# Patient Record
Sex: MALE | Race: ASIAN | Employment: FULL TIME | URBAN - METROPOLITAN AREA
[De-identification: names, ages, dates, MRNs, and addresses within clinical notes are randomized per-mention and may not be internally consistent; named-entity substitution may affect disease eponyms.]

---

## 2017-06-23 ENCOUNTER — OFFICE VISIT (OUTPATIENT)
Dept: INTERNAL MEDICINE CLINIC | Age: 37
End: 2017-06-23

## 2017-06-23 VITALS
DIASTOLIC BLOOD PRESSURE: 64 MMHG | BODY MASS INDEX: 21.42 KG/M2 | WEIGHT: 153 LBS | SYSTOLIC BLOOD PRESSURE: 120 MMHG | HEART RATE: 76 BPM | HEIGHT: 71 IN

## 2017-06-23 DIAGNOSIS — R71.8 RBC MICROCYTOSIS: ICD-10-CM

## 2017-06-23 DIAGNOSIS — H66.011 ACUTE SUPPURATIVE OTITIS MEDIA OF RIGHT EAR WITH SPONTANEOUS RUPTURE OF TYMPANIC MEMBRANE, RECURRENCE NOT SPECIFIED: ICD-10-CM

## 2017-06-23 DIAGNOSIS — E55.9 VITAMIN D DEFICIENCY: ICD-10-CM

## 2017-06-23 DIAGNOSIS — G44.89 OTHER HEADACHE SYNDROME: ICD-10-CM

## 2017-06-23 DIAGNOSIS — G61.0 GUILLAIN-BARRE SYNDROME (HCC): Primary | ICD-10-CM

## 2017-06-23 PROCEDURE — 99213 OFFICE O/P EST LOW 20 MIN: CPT | Performed by: INTERNAL MEDICINE

## 2017-06-26 ENCOUNTER — HOSPITAL ENCOUNTER (OUTPATIENT)
Dept: OTHER | Age: 37
Discharge: HOME | End: 2017-06-26
Attending: INTERNAL MEDICINE | Admitting: INTERNAL MEDICINE

## 2017-06-26 DIAGNOSIS — H66.011 ACUTE SUPPURATIVE OTITIS MEDIA OF RIGHT EAR WITH SPONTANEOUS RUPTURE OF TYMPANIC MEMBRANE, RECURRENCE NOT SPECIFIED: ICD-10-CM

## 2017-06-26 DIAGNOSIS — R71.8 RBC MICROCYTOSIS: ICD-10-CM

## 2017-06-26 DIAGNOSIS — G61.0 GUILLAIN-BARRE SYNDROME (HCC): ICD-10-CM

## 2017-06-26 DIAGNOSIS — G44.89 OTHER HEADACHE SYNDROME: ICD-10-CM

## 2017-06-26 DIAGNOSIS — E55.9 VITAMIN D DEFICIENCY: ICD-10-CM

## 2017-06-26 LAB
A/G RATIO: 1.5 (ref 1.1–2.2)
ALBUMIN SERPL-MCNC: 4.8 G/DL (ref 3.4–5)
ALP BLD-CCNC: 60 U/L (ref 40–129)
ALT SERPL-CCNC: 26 U/L (ref 10–40)
ANION GAP SERPL CALCULATED.3IONS-SCNC: 15 MMOL/L (ref 3–16)
AST SERPL-CCNC: 21 U/L (ref 15–37)
BILIRUB SERPL-MCNC: 0.5 MG/DL (ref 0–1)
BUN BLDV-MCNC: 11 MG/DL (ref 7–20)
CALCIUM SERPL-MCNC: 9.2 MG/DL (ref 8.3–10.6)
CHLORIDE BLD-SCNC: 101 MMOL/L (ref 99–110)
CHOLESTEROL, TOTAL: 207 MG/DL (ref 0–199)
CO2: 25 MMOL/L (ref 21–32)
CREAT SERPL-MCNC: 0.8 MG/DL (ref 0.9–1.3)
GFR AFRICAN AMERICAN: >60
GFR NON-AFRICAN AMERICAN: >60
GLOBULIN: 3.1 G/DL
GLUCOSE BLD-MCNC: 88 MG/DL (ref 70–99)
HDLC SERPL-MCNC: 43 MG/DL (ref 40–60)
LDL CHOLESTEROL CALCULATED: 128 MG/DL
POTASSIUM SERPL-SCNC: 4.2 MMOL/L (ref 3.5–5.1)
SEDIMENTATION RATE, ERYTHROCYTE: 0 MM/HR (ref 0–15)
SODIUM BLD-SCNC: 141 MMOL/L (ref 136–145)
TOTAL PROTEIN: 7.9 G/DL (ref 6.4–8.2)
TRIGL SERPL-MCNC: 179 MG/DL (ref 0–150)
VITAMIN D 25-HYDROXY: 26.2 NG/ML
VLDLC SERPL CALC-MCNC: 36 MG/DL

## 2017-06-27 LAB
HCT VFR BLD CALC: 47.1 % (ref 40.5–52.5)
HEMATOLOGY PATH CONSULT: NO
HEMOGLOBIN: 14.7 G/DL (ref 13.5–17.5)
MCH RBC QN AUTO: 21.1 PG (ref 26–34)
MCHC RBC AUTO-ENTMCNC: 31.2 G/DL (ref 31–36)
MCV RBC AUTO: 67.6 FL (ref 80–100)
PDW BLD-RTO: 15.3 % (ref 12.4–15.4)
PLATELET # BLD: 322 K/UL (ref 135–450)
PMV BLD AUTO: 7.2 FL (ref 5–10.5)
RBC # BLD: 6.97 M/UL (ref 4.2–5.9)
SLIDE REVIEW: ABNORMAL
WBC # BLD: 4.9 K/UL (ref 4–11)

## 2017-06-30 ENCOUNTER — TELEPHONE (OUTPATIENT)
Dept: INTERNAL MEDICINE CLINIC | Age: 37
End: 2017-06-30

## 2017-07-01 ENCOUNTER — HOSPITAL ENCOUNTER (OUTPATIENT)
Dept: CT IMAGING | Age: 37
Discharge: HOME | End: 2017-07-01
Attending: INTERNAL MEDICINE | Admitting: INTERNAL MEDICINE

## 2017-07-01 DIAGNOSIS — G44.89 OTHER HEADACHE SYNDROME: ICD-10-CM

## 2017-07-01 DIAGNOSIS — H66.011 ACUTE SUPPURATIVE OTITIS MEDIA OF RIGHT EAR WITH SPONTANEOUS RUPTURE OF TYMPANIC MEMBRANE, RECURRENCE NOT SPECIFIED: ICD-10-CM

## 2017-07-05 ENCOUNTER — TELEPHONE (OUTPATIENT)
Dept: INTERNAL MEDICINE CLINIC | Age: 37
End: 2017-07-05

## 2017-07-05 DIAGNOSIS — J32.2 ETHMOID SINUSITIS, UNSPECIFIED CHRONICITY: Primary | ICD-10-CM

## 2017-07-05 RX ORDER — AZELASTINE 1 MG/ML
2 SPRAY, METERED NASAL 2 TIMES DAILY
Qty: 1 BOTTLE | Refills: 3 | Status: SHIPPED | OUTPATIENT
Start: 2017-07-05 | End: 2018-01-31

## 2017-07-14 ENCOUNTER — OFFICE VISIT (OUTPATIENT)
Dept: ENT CLINIC | Age: 37
End: 2017-07-14

## 2017-07-14 VITALS
SYSTOLIC BLOOD PRESSURE: 122 MMHG | HEIGHT: 71 IN | BODY MASS INDEX: 22.96 KG/M2 | WEIGHT: 164 LBS | DIASTOLIC BLOOD PRESSURE: 67 MMHG | HEART RATE: 70 BPM

## 2017-07-14 DIAGNOSIS — H90.11 CONDUCTIVE HEARING LOSS OF RIGHT EAR WITH UNRESTRICTED HEARING OF LEFT EAR: ICD-10-CM

## 2017-07-14 DIAGNOSIS — H92.02 OTALGIA OF LEFT EAR: Primary | ICD-10-CM

## 2017-07-14 DIAGNOSIS — H72.01 CENTRAL PERFORATION OF TYMPANIC MEMBRANE, RIGHT EAR: ICD-10-CM

## 2017-07-14 DIAGNOSIS — H66.11 CHRONIC TUBOTYMPANIC SUPPURATIVE OTITIS MEDIA OF RIGHT EAR: ICD-10-CM

## 2017-07-14 PROCEDURE — 99213 OFFICE O/P EST LOW 20 MIN: CPT | Performed by: OTOLARYNGOLOGY

## 2018-01-31 ENCOUNTER — HOSPITAL ENCOUNTER (OUTPATIENT)
Dept: OTHER | Age: 38
Discharge: HOME | End: 2018-01-31
Attending: INTERNAL MEDICINE | Admitting: INTERNAL MEDICINE

## 2018-01-31 ENCOUNTER — OFFICE VISIT (OUTPATIENT)
Dept: INTERNAL MEDICINE CLINIC | Age: 38
End: 2018-01-31

## 2018-01-31 VITALS
BODY MASS INDEX: 23.1 KG/M2 | HEIGHT: 71 IN | TEMPERATURE: 101 F | HEART RATE: 106 BPM | SYSTOLIC BLOOD PRESSURE: 130 MMHG | OXYGEN SATURATION: 97 % | DIASTOLIC BLOOD PRESSURE: 62 MMHG | WEIGHT: 165 LBS

## 2018-01-31 DIAGNOSIS — J11.1 FLU: Primary | ICD-10-CM

## 2018-01-31 LAB
RAPID INFLUENZA  B AGN: NEGATIVE
RAPID INFLUENZA A AGN: NEGATIVE

## 2018-01-31 PROCEDURE — 99214 OFFICE O/P EST MOD 30 MIN: CPT | Performed by: INTERNAL MEDICINE

## 2018-01-31 RX ORDER — OSELTAMIVIR PHOSPHATE 75 MG/1
75 CAPSULE ORAL 2 TIMES DAILY
Qty: 10 CAPSULE | Refills: 0 | Status: SHIPPED | OUTPATIENT
Start: 2018-01-31 | End: 2018-02-05

## 2018-02-01 ENCOUNTER — TELEPHONE (OUTPATIENT)
Dept: INTERNAL MEDICINE CLINIC | Age: 38
End: 2018-02-01

## 2018-02-05 ENCOUNTER — TELEPHONE (OUTPATIENT)
Dept: INTERNAL MEDICINE CLINIC | Age: 38
End: 2018-02-05

## 2018-02-08 ENCOUNTER — TELEPHONE (OUTPATIENT)
Dept: INTERNAL MEDICINE CLINIC | Age: 38
End: 2018-02-08

## 2018-02-08 NOTE — TELEPHONE ENCOUNTER
Pt called, c/o persistent cough. Pt would like advise on what to take OTC or rx for cough.    Pt # [phone] w/ recommendation    TK

## 2018-08-13 ENCOUNTER — OFFICE VISIT (OUTPATIENT)
Dept: ENT CLINIC | Age: 38
End: 2018-08-13

## 2018-08-13 VITALS
BODY MASS INDEX: 23.24 KG/M2 | HEIGHT: 71 IN | HEART RATE: 81 BPM | DIASTOLIC BLOOD PRESSURE: 72 MMHG | WEIGHT: 166 LBS | SYSTOLIC BLOOD PRESSURE: 128 MMHG

## 2018-08-13 DIAGNOSIS — H66.011 ACUTE SUPPURATIVE OTITIS MEDIA OF RIGHT EAR WITH SPONTANEOUS RUPTURE OF TYMPANIC MEMBRANE, RECURRENCE NOT SPECIFIED: Primary | ICD-10-CM

## 2018-08-13 DIAGNOSIS — H66.11 CHRONIC TUBOTYMPANIC SUPPURATIVE OTITIS MEDIA, RIGHT EAR: ICD-10-CM

## 2018-08-13 DIAGNOSIS — H72.2X1 MARGINAL PERFORATION OF TYMPANIC MEMBRANE OF RIGHT EAR: ICD-10-CM

## 2018-08-13 DIAGNOSIS — H60.331 ACUTE SWIMMER'S EAR OF RIGHT SIDE: ICD-10-CM

## 2018-08-13 PROCEDURE — 99214 OFFICE O/P EST MOD 30 MIN: CPT | Performed by: OTOLARYNGOLOGY

## 2018-08-13 RX ORDER — CIPROFLOXACIN AND DEXAMETHASONE 3; 1 MG/ML; MG/ML
4 SUSPENSION/ DROPS AURICULAR (OTIC) 2 TIMES DAILY
Qty: 1 BOTTLE | Refills: 0 | Status: SHIPPED | OUTPATIENT
Start: 2018-08-13 | End: 2018-08-23

## 2018-08-13 RX ORDER — AMOXICILLIN 875 MG/1
875 TABLET, COATED ORAL 2 TIMES DAILY
Qty: 20 TABLET | Refills: 0 | Status: SHIPPED | OUTPATIENT
Start: 2018-08-13 | End: 2018-08-23

## 2018-08-13 NOTE — PROGRESS NOTES
[address] [facility] [place] [facility]        PCP:  Marko Jones MD      CHIEF COMPLAINT:  Chief Complaint   Patient presents with    Otalgia     right ear       HISTORY OF PRESENT ILLNESS:   Maynard Cushing is a 42 y.o. male here today for evaluation of a problem located in the right ear. The quality is pain. Duration is 2 days. The severity was 7-8 on a 1-10 scale. This pain has been constant. He stated that the pain is better now and currently, at 3 on a 1-10 scale. He has noticed pus and bleeding from the right ear. He denied fever and chills. He denied any other ENT symptoms. REVIEW OF SYSTEMS:   CONSTITUTIONAL:  Denied fever. Denied chills. EARS, NOSE, THROAT:  Denied hearing loss, tinnitus, nasal dyspnea, rhinorrhea, sore throat and chronic hoarseness. 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[facility])         Past Surgical History:   Procedure Laterality Date    INNER EAR SURGERY      OTHER SURGICAL HISTORY  10/29/12    port removal    TUNNELED VENOUS CATHETER PLACEMENT  10/16/12    right subclavian        Current Outpatient Prescriptions   Medication Sig Dispense Refill    Cholecalciferol (VITAMIN D) 2000 UNITS CAPS capsule Take 2 capsules by mouth daily        Current Facility-Administered Medications   Medication Dose Route Frequency Provider Last Rate Last Dose    acetaminophen (TYLENOL) tablet 650 mg  650 mg Oral Q4H PRN Marko Jones MD              EXAMINATION:      VITALS SIGNS reviewed. Vitals:    08/13/18 1423   BP: 128/72   Pulse: 81      GENERAL APPEARANCE:  Well developed, well nourished, no apparent distress. EXTERNAL EAR/NOSE:  Normal pinnae and mastoids. Normal external nose. (+) EARS, OTOMICROSCOPY:  The right tympanic membrane was erythematous and edematous, with a large marginal perforation through which was draining purulent effusion. The middle ear mucosa appeared to be erythematous and edematous.   Ciprodex otic worsen, an ear infection. · Before bathing, showering or washing the hair, a plug of cotton coated with petroleum jelly should be placed in the opening of the ear canal.  After bathing the cotton should be removed and the outer part of the ear dried with a soft towel. Alternatively, you may use a silicone putty ear plug purchased from your pharmacy. · If water does enter the ear, drain the water out into a tissue or cotton ball. Then, instill into the ear four drops of the eardrops you were given. Follow-up  Return in about 1 month (around 9/13/2018) for recheck/follow-up, and sooner if condition worsens.

## 2018-09-14 ENCOUNTER — OFFICE VISIT (OUTPATIENT)
Dept: ENT CLINIC | Age: 38
End: 2018-09-14

## 2018-09-14 VITALS
HEART RATE: 73 BPM | DIASTOLIC BLOOD PRESSURE: 61 MMHG | WEIGHT: 167 LBS | HEIGHT: 71 IN | SYSTOLIC BLOOD PRESSURE: 112 MMHG | BODY MASS INDEX: 23.38 KG/M2

## 2018-09-14 DIAGNOSIS — H66.11 CHRONIC TUBOTYMPANIC SUPPURATIVE OTITIS MEDIA, RIGHT EAR: ICD-10-CM

## 2018-09-14 DIAGNOSIS — H72.2X1 MARGINAL PERFORATION OF TYMPANIC MEMBRANE OF RIGHT EAR: ICD-10-CM

## 2018-09-14 DIAGNOSIS — H66.011 ACUTE SUPPURATIVE OTITIS MEDIA OF RIGHT EAR WITH SPONTANEOUS RUPTURE OF TYMPANIC MEMBRANE, RECURRENCE NOT SPECIFIED: Primary | ICD-10-CM

## 2018-09-14 PROCEDURE — 92504 EAR MICROSCOPY EXAMINATION: CPT | Performed by: OTOLARYNGOLOGY

## 2018-09-14 PROCEDURE — 99214 OFFICE O/P EST MOD 30 MIN: CPT | Performed by: OTOLARYNGOLOGY

## 2018-09-14 RX ORDER — ACETAMINOPHEN 500 MG
500 TABLET ORAL EVERY 6 HOURS PRN
COMMUNITY
End: 2021-07-02

## 2018-09-14 NOTE — PROGRESS NOTES
[address] [facility]     PCP:  Herlinda Amato MD      CHIEF COMPLAINT:  Chief Complaint   Patient presents with    Ear Problem     Recheck       HISTORY OF PRESENT ILLNESS:   Shell Sterling is a 42 y.o. male here for recheck and follow-up of right ear pain and drainage. He stated that he is currently having no ear pain or drainage. He stated that his hearing is back to his usual level in the right ear and seems to be normal in the left ear. REVIEW OF SYSTEMS:   EARS, NOSE, THROAT:  Denied otorrhea and otalgia. Current Outpatient Prescriptions   Medication Sig Dispense Refill    acetaminophen (TYLENOL) 500 MG tablet Take 500 mg by mouth every 6 hours as needed for Pain      Cholecalciferol (VITAMIN D) 2000 UNITS CAPS capsule Take 2 capsules by mouth daily        Current Facility-Administered Medications   Medication Dose Route Frequency Provider Last Rate Last Dose    acetaminophen (TYLENOL) tablet 650 mg  650 mg Oral Q4H PRN Herlinda Amato MD                EXAMINATION:      VITALS SIGNS reviewed. Vitals:    09/14/18 1521   BP: 112/61   Pulse: 73      GENERAL APPEARANCE:  Well developed, well nourished, no apparent distress, no apparent deformities. EXTERNAL EAR/NOSE:  Normal pinnae and mastoids. Normal external nose. EARS, OTOMICROSCOPY:  There was a large 75% perforation of the right tympanic membrane involving the entire inferior tympanic membrane up to half of the manubrium. There was a narrow rim of intact tympanic membrane around the entire annulus of approximately 1  mm. The residual tympanic membrane was within normal limits with no erythema. The middle ear mucosa appeared to be normal with no erythema, exudate, or edema. There was no middle ear or external auditory canal effusion or exudate. COUNSELING  I discussed the tympanic membrane perforation and conductive hearing loss. I discussed the risk of recurrent infections with the perforation.   I reviewed his audiometric testing results and discuss the normal cochlear reserve in the right ear in the pure conductive hearing loss. I advised this may be improved with surgery. I discussed tympanoplasty surgery. I advised that, closure of the tympanic membrane may improve his hearing and may also prevent ear infections from contamination. I also advised that surgery could result in worsening of hearing or no improvement in hearing, dizziness, and pain. I advised there is no guarantee of cure, success, or of any final results, particularly improvement in hearing. I advised her risks of surgery including risks of general anesthesia including cardiac arrest, heart attack, stroke and death. Macie stated that he has had ear surgery when in [facility] and that this was not successful. Because of that reason he is not inclined to proceed with surgery but rather desires to proceed with a hearing aid. IMPRESSION / Orville Sotomayor / Dariana.Mao / PROCEDURES:       Noa Durbin was seen today for ear problem. Diagnoses and all orders for this visit:    Acute suppurative otitis media of right ear with spontaneous rupture of tympanic membrane, recurrence not specified    Chronic tubotympanic suppurative otitis media, right ear    Marginal perforation of tympanic membrane of right ear         PLAN:  Hearing aid   Consider tympanoplasty, if HA does not work well. RECOMMENDATIONS / PLAN:    Consider tympanoplasty surgery, if hearing aid and amplification therapy is not successful. Patient will proceed with acquisition of a hearing aid for the right ear. He was given contact information for Dr. Christ Calzada for this purpose. Return for any further Ear, Nose, Throat, or Sinus problems. Patient Instructions   HEARING AID  You should consider amplification therapy. I recommend a right hearing aid for aural rehabilitation and to aid in restoring functional hearing.   You may follow up with Dr. Cruz Andino or with

## 2018-09-14 NOTE — PATIENT INSTRUCTIONS
HEARING AID  You should consider amplification therapy. I recommend a right hearing aid for aural rehabilitation and to aid in restoring functional hearing. You may follow up with Dr. Chava Alcaraz or with another hearing aid provider of your choice. WATER PRECAUTIONS  · Do not allow water to get into the right ears, as this may cause an ear infection. · Before bathing, showering or washing the hair, a plug of cotton coated with petroleum jelly should be placed in the opening of the ear canal.  After bathing the cotton should be removed and the outer part of the ear dried with a soft towel. Alternatively, you may use a silicone putty ear plug purchased from your pharmacy. · Swimming may be permitted if you wear adequate ear plugs. · If water does enter the ear, drain the water out into a tissue or cotton ball. Call if you then develop drainage from or pain in the ear.

## 2019-06-03 ENCOUNTER — OFFICE VISIT (OUTPATIENT)
Dept: ENT CLINIC | Age: 39
End: 2019-06-03
Payer: COMMERCIAL

## 2019-06-03 VITALS
DIASTOLIC BLOOD PRESSURE: 69 MMHG | TEMPERATURE: 98.3 F | HEART RATE: 85 BPM | HEIGHT: 71 IN | SYSTOLIC BLOOD PRESSURE: 116 MMHG | WEIGHT: 165.1 LBS | BODY MASS INDEX: 23.11 KG/M2

## 2019-06-03 DIAGNOSIS — H90.11 CONDUCTIVE HEARING LOSS OF RIGHT EAR WITH UNRESTRICTED HEARING OF LEFT EAR: ICD-10-CM

## 2019-06-03 DIAGNOSIS — H66.011 ACUTE SUPPURATIVE OTITIS MEDIA OF RIGHT EAR WITH SPONTANEOUS RUPTURE OF TYMPANIC MEMBRANE, RECURRENCE NOT SPECIFIED: Primary | ICD-10-CM

## 2019-06-03 DIAGNOSIS — H66.11 CHRONIC TUBOTYMPANIC SUPPURATIVE OTITIS MEDIA, RIGHT EAR: ICD-10-CM

## 2019-06-03 DIAGNOSIS — H72.2X1 MARGINAL PERFORATION OF TYMPANIC MEMBRANE OF RIGHT EAR: ICD-10-CM

## 2019-06-03 PROCEDURE — 99213 OFFICE O/P EST LOW 20 MIN: CPT | Performed by: OTOLARYNGOLOGY

## 2019-06-03 RX ORDER — CIPROFLOXACIN AND DEXAMETHASONE 3; 1 MG/ML; MG/ML
4 SUSPENSION/ DROPS AURICULAR (OTIC) 2 TIMES DAILY
Qty: 1 BOTTLE | Refills: 0 | Status: SHIPPED | OUTPATIENT
Start: 2019-06-03 | End: 2019-06-10

## 2019-12-16 DIAGNOSIS — Z12.5 SCREENING FOR PROSTATE CANCER: ICD-10-CM

## 2019-12-16 DIAGNOSIS — Z00.00 ROUTINE GENERAL MEDICAL EXAMINATION AT A HEALTH CARE FACILITY: Primary | ICD-10-CM

## 2019-12-21 ENCOUNTER — HOSPITAL ENCOUNTER (OUTPATIENT)
Age: 39
Discharge: HOME | End: 2019-12-21
Payer: COMMERCIAL

## 2019-12-21 DIAGNOSIS — Z12.5 SCREENING FOR PROSTATE CANCER: ICD-10-CM

## 2019-12-21 DIAGNOSIS — Z00.00 ROUTINE GENERAL MEDICAL EXAMINATION AT A HEALTH CARE FACILITY: ICD-10-CM

## 2019-12-21 LAB
A/G RATIO: 1.8 (ref 1.1–2.2)
ALBUMIN SERPL-MCNC: 4.9 G/DL (ref 3.4–5)
ALP BLD-CCNC: 69 U/L (ref 40–129)
ALT SERPL-CCNC: 21 U/L (ref 10–40)
ANION GAP SERPL CALCULATED.3IONS-SCNC: 15 MMOL/L (ref 3–16)
AST SERPL-CCNC: 18 U/L (ref 15–37)
BILIRUB SERPL-MCNC: 0.3 MG/DL (ref 0–1)
BUN BLDV-MCNC: 16 MG/DL (ref 7–20)
CALCIUM SERPL-MCNC: 9.5 MG/DL (ref 8.3–10.6)
CHLORIDE BLD-SCNC: 103 MMOL/L (ref 99–110)
CHOLESTEROL, FASTING: 209 MG/DL (ref 0–199)
CO2: 26 MMOL/L (ref 21–32)
CREAT SERPL-MCNC: 1 MG/DL (ref 0.9–1.3)
GFR AFRICAN AMERICAN: >60
GFR NON-AFRICAN AMERICAN: >60
GLOBULIN: 2.7 G/DL
GLUCOSE FASTING: 94 MG/DL (ref 70–99)
HDLC SERPL-MCNC: 46 MG/DL (ref 40–60)
LDL CHOLESTEROL CALCULATED: 139 MG/DL
POTASSIUM SERPL-SCNC: 5.5 MMOL/L (ref 3.5–5.1)
PROSTATE SPECIFIC ANTIGEN: 0.85 NG/ML (ref 0–4)
SODIUM BLD-SCNC: 144 MMOL/L (ref 136–145)
T4 FREE: 1.2 NG/DL (ref 0.9–1.8)
TOTAL PROTEIN: 7.6 G/DL (ref 6.4–8.2)
TRIGLYCERIDE, FASTING: 122 MG/DL (ref 0–150)
TSH SERPL DL<=0.05 MIU/L-ACNC: 1.45 UIU/ML (ref 0.27–4.2)
VLDLC SERPL CALC-MCNC: 24 MG/DL

## 2019-12-21 PROCEDURE — 80061 LIPID PANEL: CPT

## 2019-12-21 PROCEDURE — 80053 COMPREHEN METABOLIC PANEL: CPT

## 2019-12-21 PROCEDURE — 84443 ASSAY THYROID STIM HORMONE: CPT

## 2019-12-21 PROCEDURE — 85025 COMPLETE CBC W/AUTO DIFF WBC: CPT

## 2019-12-21 PROCEDURE — 84153 ASSAY OF PSA TOTAL: CPT

## 2019-12-21 PROCEDURE — 36415 COLL VENOUS BLD VENIPUNCTURE: CPT

## 2019-12-21 PROCEDURE — 84439 ASSAY OF FREE THYROXINE: CPT

## 2019-12-22 DIAGNOSIS — E87.5 HYPERKALEMIA: Primary | ICD-10-CM

## 2019-12-23 DIAGNOSIS — E87.5 HYPERKALEMIA: ICD-10-CM

## 2019-12-23 LAB
BASOPHILS ABSOLUTE: 0 K/UL (ref 0–0.2)
BASOPHILS RELATIVE PERCENT: 0.7 %
EOSINOPHILS ABSOLUTE: 0.1 K/UL (ref 0–0.6)
EOSINOPHILS RELATIVE PERCENT: 1.8 %
HCT VFR BLD CALC: 47.6 % (ref 40.5–52.5)
HEMATOLOGY PATH CONSULT: NO
HEMOGLOBIN: 15 G/DL (ref 13.5–17.5)
LYMPHOCYTES ABSOLUTE: 2 K/UL (ref 1–5.1)
LYMPHOCYTES RELATIVE PERCENT: 38.9 %
MCH RBC QN AUTO: 21.3 PG (ref 26–34)
MCHC RBC AUTO-ENTMCNC: 31.6 G/DL (ref 31–36)
MCV RBC AUTO: 67.4 FL (ref 80–100)
MONOCYTES ABSOLUTE: 0.4 K/UL (ref 0–1.3)
MONOCYTES RELATIVE PERCENT: 6.8 %
NEUTROPHILS ABSOLUTE: 2.7 K/UL (ref 1.7–7.7)
NEUTROPHILS RELATIVE PERCENT: 51.8 %
PDW BLD-RTO: 15.1 % (ref 12.4–15.4)
PLATELET # BLD: 321 K/UL (ref 135–450)
PMV BLD AUTO: 7.3 FL (ref 5–10.5)
POTASSIUM SERPL-SCNC: 4.5 MMOL/L (ref 3.5–5.1)
RBC # BLD: 7.06 M/UL (ref 4.2–5.9)
WBC # BLD: 5.3 K/UL (ref 4–11)

## 2019-12-31 ENCOUNTER — OFFICE VISIT (OUTPATIENT)
Dept: PRIMARY CARE CLINIC | Age: 39
End: 2019-12-31
Payer: COMMERCIAL

## 2019-12-31 VITALS
RESPIRATION RATE: 13 BRPM | DIASTOLIC BLOOD PRESSURE: 86 MMHG | HEART RATE: 80 BPM | TEMPERATURE: 97.8 F | SYSTOLIC BLOOD PRESSURE: 134 MMHG | HEIGHT: 71 IN | OXYGEN SATURATION: 99 % | BODY MASS INDEX: 19.46 KG/M2 | WEIGHT: 139 LBS

## 2019-12-31 DIAGNOSIS — Z00.00 ROUTINE GENERAL MEDICAL EXAMINATION AT A HEALTH CARE FACILITY: Primary | ICD-10-CM

## 2019-12-31 DIAGNOSIS — G61.0 GUILLAIN BARRÉ SYNDROME (HCC): ICD-10-CM

## 2019-12-31 LAB
BILIRUBIN, POC: NORMAL
BLOOD URINE, POC: NORMAL
CLARITY, POC: CLEAR
COLOR, POC: YELLOW
GLUCOSE URINE, POC: NORMAL
KETONES, POC: NORMAL
LEUKOCYTE EST, POC: NORMAL
NITRITE, POC: NORMAL
PH, POC: 7
PROTEIN, POC: NORMAL
SPECIFIC GRAVITY, POC: 1.02
UROBILINOGEN, POC: 0.2

## 2019-12-31 PROCEDURE — 99214 OFFICE O/P EST MOD 30 MIN: CPT | Performed by: INTERNAL MEDICINE

## 2019-12-31 PROCEDURE — 81002 URINALYSIS NONAUTO W/O SCOPE: CPT | Performed by: INTERNAL MEDICINE

## 2019-12-31 ASSESSMENT — PATIENT HEALTH QUESTIONNAIRE - PHQ9
1. LITTLE INTEREST OR PLEASURE IN DOING THINGS: 0
SUM OF ALL RESPONSES TO PHQ9 QUESTIONS 1 & 2: 0
SUM OF ALL RESPONSES TO PHQ QUESTIONS 1-9: 0
2. FEELING DOWN, DEPRESSED OR HOPELESS: 0
SUM OF ALL RESPONSES TO PHQ QUESTIONS 1-9: 0

## 2020-01-15 ENCOUNTER — TELEPHONE (OUTPATIENT)
Dept: PRIMARY CARE CLINIC | Age: 40
End: 2020-01-15

## 2020-01-15 NOTE — TELEPHONE ENCOUNTER
Pt has a prescription for 2 leg braces he is requesting that if  they can be put through the insurance to pay for them. Since they are too expensive.

## 2021-04-29 ENCOUNTER — OFFICE VISIT (OUTPATIENT)
Dept: ENT CLINIC | Age: 41
End: 2021-04-29
Payer: COMMERCIAL

## 2021-04-29 VITALS
WEIGHT: 174 LBS | HEART RATE: 95 BPM | SYSTOLIC BLOOD PRESSURE: 134 MMHG | BODY MASS INDEX: 24.27 KG/M2 | DIASTOLIC BLOOD PRESSURE: 84 MMHG

## 2021-04-29 DIAGNOSIS — H66.014 RECURRENT ACUTE SUPPURATIVE OTITIS MEDIA OF RIGHT EAR WITH SPONTANEOUS RUPTURE OF TYMPANIC MEMBRANE: Primary | ICD-10-CM

## 2021-04-29 DIAGNOSIS — H66.11 CHRONIC TUBOTYMPANIC SUPPURATIVE OTITIS MEDIA OF RIGHT EAR: Chronic | ICD-10-CM

## 2021-04-29 DIAGNOSIS — H72.01 CENTRAL PERFORATION OF TYMPANIC MEMBRANE, RIGHT EAR: Chronic | ICD-10-CM

## 2021-04-29 PROCEDURE — 99213 OFFICE O/P EST LOW 20 MIN: CPT | Performed by: OTOLARYNGOLOGY

## 2021-04-29 RX ORDER — CIPROFLOXACIN AND DEXAMETHASONE 3; 1 MG/ML; MG/ML
4 SUSPENSION/ DROPS AURICULAR (OTIC) 2 TIMES DAILY
Qty: 1 BOTTLE | Refills: 0 | Status: SHIPPED | OUTPATIENT
Start: 2021-04-29 | End: 2021-05-09

## 2021-04-29 ASSESSMENT — ENCOUNTER SYMPTOMS
RHINORRHEA: 0
SORE THROAT: 0

## 2021-04-29 NOTE — PROGRESS NOTES
[facility]         PCP:  Timur Lazo MD      CHIEF COMPLAINT  Chief Complaint   Patient presents with    Ear Problem     same issues with the right ear, water in the ear        HISTORY  Dates Dr is a 41 y.o. male here for evaluation and treatment of a right ear problem, infection. Previous ear problem cleared up and then recurred about one month ago. REVIEW OF SYSTEMS   Review of Systems   Constitutional: Negative for chills and fever. HENT: Positive for ear discharge (right ear smells bad) and hearing loss. Negative for congestion, ear pain, rhinorrhea, sore throat and tinnitus. Neurological: Light-headedness:           PAST MEDICAL HISTORY    Past Medical History:   Diagnosis Date    [facility]          Past Surgical History:   Procedure Laterality Date    INNER EAR SURGERY      OTHER SURGICAL HISTORY  10/29/12    port removal    TUNNELED VENOUS CATHETER PLACEMENT  10/16/12    right subclavian         EXAMINATION      Vitals:    04/29/21 1403   BP: 134/84   Site: Right Upper Arm   Position: Sitting   Cuff Size: Medium Adult   Pulse: 95   Weight: 174 lb (78.9 kg)         Physical Exam  Vitals reviewed. Constitutional:       General: He is awake. He is not in acute distress. Appearance: Normal appearance. He is well-developed. He is not ill-appearing or toxic-appearing. HENT:      Head: Normocephalic and atraumatic. Salivary Glands: Right salivary gland is not diffusely enlarged or tender. Left salivary gland is not diffusely enlarged or tender. Right Ear: Ear canal and external ear normal.      Left Ear: Ear canal and external ear normal.      Ears:      Comments: Bilateral otomicroscopy performed. Right TM with large inferior perforation, purulent exudate in middle ear at perforation removed with suction. Middle ear mucosa erythematous and edematous.   TM erythematous and edematous. EAC with erythema and edema. Cerumen accumulation removed from EAC with suction. Ciprofloxacin ophthalmic solution instilled. Left TM non-erythematous, dull with patches of sclerosis and with a large inferior monomeric membrane consistent with healed perforation. Nose: Nose normal. No nasal deformity, septal deviation, mucosal edema, congestion or rhinorrhea. Right Turbinates: Not enlarged. Left Turbinates: Not enlarged. Right Sinus: No maxillary sinus tenderness or frontal sinus tenderness. Left Sinus: No maxillary sinus tenderness or frontal sinus tenderness. Mouth/Throat:      Lips: Pink. No lesions. Mouth: Mucous membranes are moist. No oral lesions. Tongue: No lesions. Palate: No mass and lesions. Pharynx: Oropharynx is clear. Uvula midline. No oropharyngeal exudate or posterior oropharyngeal erythema. Tonsils: No tonsillar exudate or tonsillar abscesses. Neck:      Thyroid: No thyroid mass, thyromegaly or thyroid tenderness. Trachea: No tracheal deviation. Musculoskeletal:      Cervical back: Normal range of motion and neck supple. No rigidity. No muscular tenderness. Lymphadenopathy:      Cervical: No cervical adenopathy. Neurological:      Mental Status: He is alert. Pernell Jernigan / Jennifer Morgan / Clayton Del Cid was seen today for ear problem. Diagnoses and all orders for this visit:    Recurrent acute suppurative otitis media of right ear with spontaneous rupture of tympanic membrane  -     ciprofloxacin-dexamethasone (CIPRODEX) 0.3-0.1 % otic suspension; Place 4 drops into the right ear 2 times daily for 10 days    Chronic tubotympanic suppurative otitis media of right ear    Central perforation of tympanic membrane, right ear             RECOMMENDATIONS/PLAN      1. Acetaminophen (eg. Tylenol) or Ibuprofen (eg. Advil) (over the counter medications) as needed for pain.   2. Return in about 2 weeks (around 5/13/2021) for recheck/follow-up, and sooner if condition worsens. 3. Consider repair of perforation of right tympanic membrane. Patient Instructions     WATER PRECAUTIONS  · Do not allow water to get into your right ear, as this may cause, or worsen, an ear infection. · Before bathing, showering or washing your hair, a plug of cotton coated with petroleum jelly should be placed in the opening of your ear canal.  After bathing the cotton should be removed and the outer part of your ear dried with a soft towel. Alternatively, you may use a silicone putty ear plug purchased from your pharmacy. · Swimming may be permitted if you wear adequate ear plugs. · If water does enter your ear, drain the water out into a tissue or cotton ball. Then, instill into your ear four drops of the eardrops you were prescribed    Call the office if you develop develops pain or drainage.                 MEDICAL DECISION MAKING    # and complexity of problems addressed:  99602 - Low     1 stable chronic illness  1 acute, uncomplicated illness or injury     Amount and/or Complexity of Data to be Reviewed and Analyzed  41312 - Straightforward  no data or only 1 test or document reviewed or ordered    Risk of Complications and /or Morbidity or Mortality of Patient Management  26683 - Moderate  Prescription drug management

## 2021-04-29 NOTE — PATIENT INSTRUCTIONS
WATER PRECAUTIONS  · Do not allow water to get into your right ear, as this may cause, or worsen, an ear infection. · Before bathing, showering or washing your hair, a plug of cotton coated with petroleum jelly should be placed in the opening of your ear canal.  After bathing the cotton should be removed and the outer part of your ear dried with a soft towel. Alternatively, you may use a silicone putty ear plug purchased from your pharmacy. · Swimming may be permitted if you wear adequate ear plugs. · If water does enter your ear, drain the water out into a tissue or cotton ball. Then, instill into your ear four drops of the eardrops you were prescribed    Call the office if you develop develops pain or drainage.

## 2021-05-13 ENCOUNTER — OFFICE VISIT (OUTPATIENT)
Dept: ENT CLINIC | Age: 41
End: 2021-05-13
Payer: COMMERCIAL

## 2021-05-13 VITALS — DIASTOLIC BLOOD PRESSURE: 85 MMHG | HEART RATE: 94 BPM | SYSTOLIC BLOOD PRESSURE: 134 MMHG

## 2021-05-13 DIAGNOSIS — H72.01 CENTRAL PERFORATION OF TYMPANIC MEMBRANE, RIGHT EAR: ICD-10-CM

## 2021-05-13 DIAGNOSIS — H66.014 RECURRENT ACUTE SUPPURATIVE OTITIS MEDIA OF RIGHT EAR WITH SPONTANEOUS RUPTURE OF TYMPANIC MEMBRANE: ICD-10-CM

## 2021-05-13 DIAGNOSIS — H91.93 BILATERAL HEARING LOSS, UNSPECIFIED HEARING LOSS TYPE: Primary | ICD-10-CM

## 2021-05-13 DIAGNOSIS — H66.11 CHRONIC TUBOTYMPANIC SUPPURATIVE OTITIS MEDIA OF RIGHT EAR: ICD-10-CM

## 2021-05-13 PROCEDURE — 99213 OFFICE O/P EST LOW 20 MIN: CPT | Performed by: OTOLARYNGOLOGY

## 2021-05-13 ASSESSMENT — ENCOUNTER SYMPTOMS
RHINORRHEA: 0
SORE THROAT: 0

## 2021-05-13 NOTE — PROGRESS NOTES
[facility]       PCP:  Dima Luis MD      CHIEF COMPLAINT  Chief Complaint   Patient presents with    Hearing Loss     voices sound muffled, and hearing has decreased        HISTORY OF PRESENT ILLNESS       Samantha Rios is a 41 y.o. male here for evaluation and treatment of haring loss. Hearing is decreased in both ears. No pain in ears. No pus draining out of ears. Last dose of ear drops on Sunday. REVIEW OF SYSTEMS   Review of Systems   Constitutional: Negative for chills and fever. HENT: Positive for hearing loss (both ears for two weeks) and tinnitus (both ears for two weeks). Negative for ear discharge, rhinorrhea and sore throat. PAST MEDICAL HISTORY    Past Medical History:   Diagnosis Date    [facility]          Past Surgical History:   Procedure Laterality Date    INNER EAR SURGERY      OTHER SURGICAL HISTORY  10/29/12    port removal    TUNNELED VENOUS CATHETER PLACEMENT  10/16/12    right subclavian         EXAMINATION      Vitals:    05/13/21 1506   BP: 134/85   Site: Right Upper Arm   Position: Sitting   Cuff Size: Medium Adult   Pulse: 94         Physical Exam  Vitals reviewed. Constitutional:       General: He is not in acute distress. Appearance: Normal appearance. He is not ill-appearing. HENT:      Head: Normocephalic. Ears:      Mcguire exam findings: lateralizes right. Right Rinne: BC > AC. Left Rinne: AC > BC. Comments: Bilateral otomicroscopy performed. Left TM dull, thick, nonerythematous,  with inferior monomer, hypermobile. Right TM with large perforation, no pus, and mild residual ear drops. Finger rub:  Able to hear finger rub at the external meatus bilaterally. Neurological:      Mental Status: He is alert. Les Greer / Michael Valdez / Guicho Holman was seen today for hearing loss.     Diagnoses and all orders for this visit:    Bilateral hearing loss, unspecified hearing loss type  -     [address] [place] [place], Audiology, [facility]    Recurrent acute suppurative otitis media of right ear with spontaneous rupture of tympanic membrane  Comments:  appears to be resolved. Central perforation of tympanic membrane, right ear    Chronic tubotympanic suppurative otitis media of right ear           RECOMMENDATIONS/PLAN      1. Audiogram.  2. Acetaminophen (eg. Tylenol) or Ibuprofen (eg. Advil) (over the counter medications) as needed for pain. 3. Return in about 1 month (around 6/13/2021) for recheck/follow-up, and sooner if condition worsens. Patient Instructions   Proceed with hearing testing. WATER PRECAUTIONS  · Do not allow water to get into your right ear, as this may cause, or worsen, an ear infection. · Before bathing, showering or washing your hair, a plug of cotton coated with petroleum jelly should be placed in the opening of your ear canal.  After bathing the cotton should be removed and the outer part of your ear dried with a soft towel. Alternatively, you may use a silicone putty ear plug purchased from your pharmacy. · Swimming may be permitted if you wear adequate ear plugs. · If water does enter your ear, drain the water out into a tissue or cotton ball. Then, instill into your ear four drops of the eardrops you were prescribed. Call the office if you develop develops pain or drainage.             MEDICAL DECISION MAKING    # and complexity of problems addressed:  04026 - Low     1 stable chronic illness    Amount and/or Complexity of Data to be Reviewed and Analyzed  28583 - Straightforward  no data or only 1 test or document reviewed or ordered    Risk of Complications and /or Morbidity or Mortality of Patient Management  25075 - Low

## 2021-05-13 NOTE — PATIENT INSTRUCTIONS
Proceed with hearing testing. WATER PRECAUTIONS  · Do not allow water to get into your right ear, as this may cause, or worsen, an ear infection. · Before bathing, showering or washing your hair, a plug of cotton coated with petroleum jelly should be placed in the opening of your ear canal.  After bathing the cotton should be removed and the outer part of your ear dried with a soft towel. Alternatively, you may use a silicone putty ear plug purchased from your pharmacy. · Swimming may be permitted if you wear adequate ear plugs. · If water does enter your ear, drain the water out into a tissue or cotton ball. Then, instill into your ear four drops of the eardrops you were prescribed. Call the office if you develop develops pain or drainage.

## 2021-06-18 ENCOUNTER — PROCEDURE VISIT (OUTPATIENT)
Dept: AUDIOLOGY | Age: 41
End: 2021-06-18
Payer: COMMERCIAL

## 2021-06-18 DIAGNOSIS — H90.11 CONDUCTIVE HEARING LOSS OF RIGHT EAR WITH UNRESTRICTED HEARING OF LEFT EAR: Primary | ICD-10-CM

## 2021-06-18 DIAGNOSIS — H74.8X1 TYPE B TYMPANOGRAM, RIGHT: ICD-10-CM

## 2021-06-18 PROCEDURE — 92557 COMPREHENSIVE HEARING TEST: CPT | Performed by: AUDIOLOGIST

## 2021-06-18 PROCEDURE — 92567 TYMPANOMETRY: CPT | Performed by: AUDIOLOGIST

## 2021-06-18 NOTE — PROGRESS NOTES
[facility]  Division of Audiology/Otolaryngology    6/18/2021     PCP: Doc Valentin MD   MRN: [id number]     AUDIOLOGIC AND OTHER PERTINENT MEDICAL HISTORY:        Gricelda was seen for hearing and middle ear evaluation. Otolaryngology (Dr Laura Gross)  is referral source of today's appointment. Patient presents with hx of right ear problems. Reports surgery in the past. Currently has right ear perforation of severe degree. AUDIOGRAM/IMMITTANCE (MIDDLE EAR FUNCTION):        Audiogram demonstrates mild low frequency sensory loss of left ear that rises to normal. Severe loss that rises to mild, conductive in nature, of right ear. Loss is asymmetrical- greater in right. Speech discrimination was good in quiet, at elevated levels. Immittance- consistent with normal middle ear function (Type A curve ) of left. Type B curve of right, consistent with middle ear pathology. Ipsilateral acoustic reflexes absent from right, elevated from left. **audiogram has improved in right ear (from 2-8 kHz) since last examination done in 2/2016**              Chart cc'd to: Mini Thapa MD    COUNSELING:          Audiogram results were reviewed with patient.             RECOMMENDATIONS:      ENT to medically advise    Electronically signed by Kassie Zamudio on 6/18/21 at 4:10 PM.

## 2021-07-02 ENCOUNTER — OFFICE VISIT (OUTPATIENT)
Dept: PRIMARY CARE CLINIC | Age: 41
End: 2021-07-02
Payer: COMMERCIAL

## 2021-07-02 VITALS
BODY MASS INDEX: 24.27 KG/M2 | SYSTOLIC BLOOD PRESSURE: 122 MMHG | HEIGHT: 71 IN | WEIGHT: 173.4 LBS | HEART RATE: 98 BPM | DIASTOLIC BLOOD PRESSURE: 74 MMHG

## 2021-07-02 DIAGNOSIS — G61.0 GUILLAIN BARRÉ SYNDROME (HCC): Primary | ICD-10-CM

## 2021-07-02 PROCEDURE — 99211 OFF/OP EST MAY X REQ PHY/QHP: CPT | Performed by: INTERNAL MEDICINE

## 2021-07-02 ASSESSMENT — PATIENT HEALTH QUESTIONNAIRE - PHQ9
1. LITTLE INTEREST OR PLEASURE IN DOING THINGS: 0
SUM OF ALL RESPONSES TO PHQ QUESTIONS 1-9: 0
SUM OF ALL RESPONSES TO PHQ QUESTIONS 1-9: 0
2. FEELING DOWN, DEPRESSED OR HOPELESS: 0
SUM OF ALL RESPONSES TO PHQ QUESTIONS 1-9: 0
SUM OF ALL RESPONSES TO PHQ9 QUESTIONS 1 & 2: 0

## 2021-07-02 NOTE — PROGRESS NOTES
Pt here for general check up, no concerns or ailments at this time. Question for pcp is that he has hx of Guillain-[place] and wants to know if safe to get covid 19 vaccine.

## 2021-07-02 NOTE — PROGRESS NOTES
7/2/2021   Macie Madison  1980    The patients PMH, surgical history, family history, medications, allergies were all reviewed and updated as appropriate today.      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    Review of Systems    OBJECTIVE:  /74 (Site: Left Upper Arm, Position: Sitting, Cuff Size: Large Adult)   Pulse 98 Comment: normal  Ht 5' 11\" (1.803 m)   Wt 173 lb 6.4 oz (78.7 kg)   BMI 24.18 kg/m²    Physical Exam  Still with wide base gait  Double masked today    Data Review:   CBC:   Lab Results   Component Value Date    WBC 5.3 12/21/2019    WBC 4.9 06/26/2017    WBC 4.6 06/28/2016    HGB 15.0 12/21/2019    HGB 14.7 06/26/2017    HGB 13.7 06/28/2016    HCT 47.6 12/21/2019    HCT 47.1 06/26/2017    HCT 44.6 06/28/2016    MCV 67.4 12/21/2019    MCV 67.6 06/26/2017    MCV 66.4 06/28/2016     12/21/2019     06/26/2017     06/28/2016     Chemistry:   Lab Results   Component Value Date     12/21/2019     06/26/2017     06/28/2016    K 4.5 12/23/2019    K 5.5 12/21/2019    K 4.2 06/26/2017     12/21/2019     06/26/2017     06/28/2016    CO2 26 12/21/2019    CO2 25 06/26/2017    CO2 26 06/28/2016    BUN 16 12/21/2019    BUN 11 06/26/2017    BUN 13 06/28/2016    CREATININE 1.0 12/21/2019    CREATININE 0.8 06/26/2017    CREATININE 0.8 06/28/2016     Hepatic Function:   Lab Results   Component Value Date    AST 18 12/21/2019 AST 21 06/26/2017    AST 19 06/28/2016    ALT 21 12/21/2019    ALT 26 06/26/2017    ALT 22 06/28/2016    BILIDIR 0.10 10/10/2012    BILITOT 0.3 12/21/2019    BILITOT 0.5 06/26/2017    BILITOT 0.5 06/28/2016    ALKPHOS 69 12/21/2019    ALKPHOS 60 06/26/2017    ALKPHOS 59 06/28/2016     No results found for: LIPASE, AMYLASE  Lipids:   Lab Results   Component Value Date    CHOL 207 06/26/2017    HDL 46 12/21/2019    TRIG 179 06/26/2017       ASSESSMENT/PLAN  1.) I advised patient NOT to get COVID vaccination due to concerns about possible recurrence of Guillain [place] again   advised continued COVID protection, he plans to double mask if plans to return to [place]     pt wants Neurology referral to see Dr. Fabien Mendoza again re: Roldan López MD      Electronically signed by Funmi Jennings MD on 7/2/2021 at 3:15 PM

## 2021-07-07 ENCOUNTER — OFFICE VISIT (OUTPATIENT)
Dept: ENT CLINIC | Age: 41
End: 2021-07-07
Payer: COMMERCIAL

## 2021-07-07 VITALS — SYSTOLIC BLOOD PRESSURE: 144 MMHG | DIASTOLIC BLOOD PRESSURE: 82 MMHG | TEMPERATURE: 97.3 F | HEART RATE: 86 BPM

## 2021-07-07 DIAGNOSIS — H66.11 CHRONIC TUBOTYMPANIC SUPPURATIVE OTITIS MEDIA OF RIGHT EAR: Primary | ICD-10-CM

## 2021-07-07 DIAGNOSIS — H72.01 CENTRAL PERFORATION OF TYMPANIC MEMBRANE, RIGHT EAR: ICD-10-CM

## 2021-07-07 DIAGNOSIS — H90.A11 CONDUCTIVE HEARING LOSS OF RIGHT EAR WITH RESTRICTED HEARING OF LEFT EAR: ICD-10-CM

## 2021-07-07 PROCEDURE — 99213 OFFICE O/P EST LOW 20 MIN: CPT | Performed by: OTOLARYNGOLOGY

## 2021-07-07 NOTE — PROGRESS NOTES
[facility]       PCP:  Yung Montalvo MD      CHIEF COMPLAINT  Chief Complaint   Patient presents with    Ear Problem     ear feels a lot better    Hearing Loss       HISTORY OF PRESENT ILLNESS       Quincy Evans is a 41 y.o. male here for recheck and follow up of ear problem. PAST MEDICAL HISTORY    Past Medical History:   Diagnosis Date    [facility])          Past Surgical History:   Procedure Laterality Date    INNER EAR SURGERY      OTHER SURGICAL HISTORY  10/29/12    port removal    TUNNELED VENOUS CATHETER PLACEMENT  10/16/12    right subclavian         EXAMINATION      Vitals:    07/07/21 1615   BP: (!) 144/82   Site: Left Upper Arm   Position: Sitting   Cuff Size: Medium Adult   Pulse: 86   Temp: 97.3 °F (36.3 °C)   TempSrc: Temporal                   COUNSELING:    Audiogram results were reviewed and discussed with Macie. I advised of the moderate to severe conductive hearing loss in the right ear. I discussed tympanoplasty and ossicular reconstruction and the possibility of correction of the conductive hearing loss. Patient declined surgery in favor of a hearing aid. I discussed the possible associated impairment and disability. I advised of the need for noise protection and avoidance of exposure to excessive noise. I discussed the possible benefits of hearing aids, or other amplification therapy. I discussed the possible etiology of tinnitus and treatment/coping measures including masking techniques. I advised of the need for annual and prn hearing tests. JAYLON / Timur Correa / Roxane Campbell was seen today for ear problem and hearing loss.     Diagnoses and all orders for this visit:    Chronic tubotympanic suppurative otitis media of right ear    Central perforation of tympanic membrane, right ear    Conductive hearing loss of right ear with restricted hearing of left ear         RECOMMENDATIONS/PLAN      1. Tympanoplasty and possible ossicular reconstruction, patient declined. 2. Hearing aid evaluation with Dr. Ryder Carlin. 3. Return after having used hearing aid for 6 weeks. 4. Consider TM reconstruction, if hearing aid is not successful or wants to not use a hearing aid. 5. Return for follow-up, to be arranged, 6 weeks after start using hearing aid.           MEDICAL DECISION MAKING    TIME  20 minutes

## 2021-07-13 ENCOUNTER — CLINICAL DOCUMENTATION (OUTPATIENT)
Dept: AUDIOLOGY | Age: 41
End: 2021-07-13

## 2021-10-11 ENCOUNTER — TELEPHONE (OUTPATIENT)
Dept: PRIMARY CARE CLINIC | Age: 41
End: 2021-10-11

## 2021-10-18 ENCOUNTER — TELEPHONE (OUTPATIENT)
Dept: PRIMARY CARE CLINIC | Age: 41
End: 2021-10-18

## 2021-10-18 NOTE — TELEPHONE ENCOUNTER
----- Message from Rose aMrsh sent at 10/18/2021 10:21 AM EDT -----  Subject: Message to Provider    QUESTIONS  Information for Provider? Pt needs the original paper stating that he   cannot get the covid vaccine sent to his home address asap.   ---------------------------------------------------------------------------  --------------  CALL BACK INFO  What is the best way for the office to contact you? OK to leave message on   voicemail  Preferred Call Back Phone Number? [phone]  ---------------------------------------------------------------------------  --------------  SCRIPT ANSWERS  Relationship to Patient?  Self

## 2021-11-01 ENCOUNTER — TELEPHONE (OUTPATIENT)
Dept: PRIMARY CARE CLINIC | Age: 41
End: 2021-11-01

## 2021-11-01 NOTE — TELEPHONE ENCOUNTER
Attempted to phone pt twice rings and sounds as if someone picks up but nobody speaks can't leave a message. The letter is ready.

## 2021-12-29 ENCOUNTER — HOSPITAL ENCOUNTER (OUTPATIENT)
Age: 41
Discharge: HOME | End: 2021-12-29
Payer: COMMERCIAL

## 2021-12-29 ENCOUNTER — VIRTUAL VISIT (OUTPATIENT)
Dept: PRIMARY CARE CLINIC | Age: 41
End: 2021-12-29
Payer: COMMERCIAL

## 2021-12-29 DIAGNOSIS — J06.9 VIRAL UPPER RESPIRATORY TRACT INFECTION: Primary | ICD-10-CM

## 2021-12-29 LAB
RAPID INFLUENZA  B AGN: NEGATIVE
RAPID INFLUENZA A AGN: NEGATIVE

## 2021-12-29 PROCEDURE — U0005 INFEC AGEN DETEC AMPLI PROBE: HCPCS

## 2021-12-29 PROCEDURE — 99213 OFFICE O/P EST LOW 20 MIN: CPT | Performed by: INTERNAL MEDICINE

## 2021-12-29 PROCEDURE — 87804 INFLUENZA ASSAY W/OPTIC: CPT

## 2021-12-29 PROCEDURE — U0003 INFECTIOUS AGENT DETECTION BY NUCLEIC ACID (DNA OR RNA); SEVERE ACUTE RESPIRATORY SYNDROME CORONAVIRUS 2 (SARS-COV-2) (CORONAVIRUS DISEASE [COVID-19]), AMPLIFIED PROBE TECHNIQUE, MAKING USE OF HIGH THROUGHPUT TECHNOLOGIES AS DESCRIBED BY CMS-2020-01-R: HCPCS

## 2021-12-29 NOTE — PROGRESS NOTES
2021    TELEHEALTH EVALUATION -- Audio/Visual (During [id number] public health emergency)    HPI:    Macie Madison (:  1980) has requested an audio/video evaluation for the following concern(s):    Flu like symptoms with low grade fever 99.9 since this AM, didn't get any flu tests due to h/o Guillain [place]    Review of Systems-cough, fever, sore throat since this AM    Prior to Visit Medications    Medication Sig Taking? Authorizing Provider   Cholecalciferol (VITAMIN D) 2000 UNITS CAPS capsule Take 2 capsules by mouth daily  Yes Historical Provider, MD       Social History     Tobacco Use    Smoking status: Never Smoker    Smokeless tobacco: Never Used   Substance Use Topics    Alcohol use: Yes     Alcohol/week: 0.0 standard drinks     Comment: rre    Drug use: No        No Known Allergies,   Past Medical History:   Diagnosis Date    Guillain-[place] ([facility] [address]    ,   Past Surgical History:   Procedure Laterality Date    INNER EAR SURGERY      OTHER SURGICAL HISTORY  10/29/12    port removal    TUNNELED VENOUS CATHETER PLACEMENT  10/16/12    right subclavian   ,   Social History     Tobacco Use    Smoking status: Never Smoker    Smokeless tobacco: Never Used   Substance Use Topics    Alcohol use:  Yes     Alcohol/week: 0.0 standard drinks     Comment: rre    Drug use: No   ,   Family History   Problem Relation Age of Onset    Diabetes Maternal Grandmother     Heart Disease Maternal Grandfather    ,   There is no immunization history on file for this patient.,   Health Maintenance   Topic Date Due    Hepatitis C screen  Never done    COVID-19 Vaccine (1) Never done    HIV screen  Never done    DTaP/Tdap/Td vaccine (1 - Tdap) Never done    Flu vaccine (1) Never done    Lipid screen  2024    Hepatitis A vaccine  Aged Out    Hepatitis B vaccine  Aged Out    Hib vaccine  Aged Out    Meningococcal (ACWY) vaccine  Aged Out    Pneumococcal 0-64 years Vaccine  Aged Out       PHYSICAL EXAMINATION:  [ INSTRUCTIONS:  \"[x]\" Indicates a positive item  \"[]\" Indicates a negative item  -- DELETE ALL ITEMS NOT EXAMINED]  Vital Signs: (As obtained by patient/caregiver or practitioner observation)    Blood pressure-  Heart rate-    Respiratory rate-    Temperature-  Pulse oximetry-   Patient-Reported Vitals 12/29/2021   Patient-Reported Weight 170   Patient-Reported Temperature 99.9      Constitutional: [] Appears well-developed and well-nourished [] No apparent distress      [] Abnormal-   Mental status  [] Alert and awake  [] Oriented to person/place/time []Able to follow commands      Eyes:  EOM    []  Normal  [] Abnormal-  Sclera  []  Normal  [] Abnormal -         Discharge []  None visible  [] Abnormal -    HENT:   [] Normocephalic, atraumatic.   [] Abnormal   [] Mouth/Throat: Mucous membranes are moist.     External Ears [] Normal  [] Abnormal-     Neck: [] No visualized mass     Pulmonary/Chest: [] Respiratory effort normal.  [] No visualized signs of difficulty breathing or respiratory distress        [] Abnormal-      Musculoskeletal:   [] Normal gait with no signs of ataxia         [] Normal range of motion of neck        [] Abnormal-       Neurological:        [] No Facial Asymmetry (Cranial nerve 7 motor function) (limited exam to video visit)          [] No gaze palsy        [] Abnormal-         Skin:        [] No significant exanthematous lesions or discoloration noted on facial skin         [] Abnormal-            Psychiatric:       [] Normal Affect [] No Hallucinations        [] Abnormal-     Other pertinent observable physical exam findings-     ASSESSMENT/PLAN:  1.) URI symptoms since last night-self quarantine-check rapid flu A&B and COVID test  Advised symptomatic management and self quarantine until COVID test result is back    \"Wants work excuse once he gets tests resulted\"  I advised pt to inform work that he is being tested for [employer] and is in [address] [place] [address], was evaluated through a synchronous (real-time) audio-video encounter. The patient (or guardian if applicable) is aware that this is a billable service. Verbal consent to proceed has been obtained within the past 12 months. The visit was conducted pursuant to the emergency declaration under the [address] [place] [address], [address] [place] [address] authority and the [employer] Act. Patient identification was verified, and a caregiver was present when appropriate. The patient was located in a state where the provider was credentialed to provide care. Total time spent on this encounter: Not billed by time    --Belinda Gomez MD on 12/29/2021 at 9:40 AM    An electronic signature was used to authenticate this note.

## 2021-12-30 ENCOUNTER — TELEPHONE (OUTPATIENT)
Dept: PRIMARY CARE CLINIC | Age: 41
End: 2021-12-30

## 2021-12-30 LAB — SARS-COV-2: DETECTED

## 2021-12-30 NOTE — TELEPHONE ENCOUNTER
----- Message from Lucia Perea sent at 12/30/2021 10:06 AM EST -----  Subject: Message to Provider    QUESTIONS  Information for Provider? Patient is needing a note for work that he saw   Dr. Jessica Anne yesterday 12/29/2021 and would like back, pt stated that you can   email the note to him   ---------------------------------------------------------------------------  --------------  [address]  What is the best way for the office to contact you? Do not leave any   message, patient will call back for answer  Preferred Call Back Phone Number?  [phone]  ---------------------------------------------------------------------------  --------------  SCRIPT ANSWERS  undefined

## 2022-01-04 ENCOUNTER — NURSE TRIAGE (OUTPATIENT)
Dept: OTHER | Facility: CLINIC | Age: 42
End: 2022-01-04

## 2022-01-04 ENCOUNTER — TELEPHONE (OUTPATIENT)
Dept: PRIMARY CARE CLINIC | Age: 42
End: 2022-01-04

## 2022-01-04 NOTE — TELEPHONE ENCOUNTER
Received call from Melia at [facility] caller not on line. Complaint:  Fatigue and  Tired dizziness     Market: [address] [place] [address] Eachpal Name: Jackeline davis    Caller's telephone number verified as [phone]      Connected with caller via phone, please see below triage    Received call from Leyda Ramon  at [facility] with Red Flag Complaint. Subjective: Caller states \"dizziness \"   Diagnosed with covid 6 days ago   Went  To  Work today     Current Symptoms:    Dark spots feeling dizzy, pt reports having to sit down        Onset: 1 day ago; sudden    Associated Symptoms:   Fatigue   Generalized weakness  Cough     Pain Severity: denies any pain     Temperature:   Pt denies  Any temp     What has been tried: tylenol  Cough syrup    LMP: NA Pregnant: No    Recommended disposition: seen  In office today     Care advice provided, patient verbalizes understanding; denies any other questions or concerns; instructed to call back for any new or worsening symptoms. Writer provided warm transfer to re at [facility] for appointment scheduling     Attention Provider: Thank you for allowing me to participate in the care of your patient. The patient was connected to triage in response to information provided to the ECC/PSC. Please do not respond through this encounter as the response is not directed to a shared pool.           Reason for Disposition   [1] PERSISTING SYMPTOMS OF COVID-19 AND [2] NEW symptom AND [3] could be serious    Protocols used: COVID-19 - PERSISTING SYMPTOMS [facility]LLOW[facility]UP [facility]

## 2022-01-04 NOTE — TELEPHONE ENCOUNTER
Pt got a positive result on 12/29/21 and went back to work Monday 1/4/22 he stating cause he felt better and decided to go back to work and worked an hour and started feel weak, dizzy, tired, and sweaty said he is requesting an appt I told the Pt since he has gotten a positive result on 12/29/21 we can offer a virtual and has declined that he wants to be seen pt states that he feels the covid is

## 2022-01-13 ENCOUNTER — TELEPHONE (OUTPATIENT)
Dept: PRIMARY CARE CLINIC | Age: 42
End: 2022-01-13

## 2022-01-13 DIAGNOSIS — R05.9 COUGH: Primary | ICD-10-CM

## 2022-01-13 NOTE — TELEPHONE ENCOUNTER
Pt was taking 12 hour coughing every night states coughing is not better pt states he had COVID 12/29/21 states now he's better but just cant get rid of the cough is asking for medication for the coughing     Pharmacy [facility]

## 2022-01-14 RX ORDER — BENZONATATE 100 MG/1
100 CAPSULE ORAL 3 TIMES DAILY PRN
Qty: 30 CAPSULE | Refills: 0 | Status: SHIPPED | OUTPATIENT
Start: 2022-01-14 | End: 2022-01-21

## 2022-04-15 ENCOUNTER — TELEPHONE (OUTPATIENT)
Dept: PRIMARY CARE CLINIC | Age: 42
End: 2022-04-15

## 2022-04-15 DIAGNOSIS — Z11.59 SCREENING FOR VIRAL DISEASE: Primary | ICD-10-CM

## 2022-04-15 DIAGNOSIS — E55.9 VITAMIN D DEFICIENCY: ICD-10-CM

## 2022-04-15 DIAGNOSIS — G61.0 GUILLAIN-BARRE SYNDROME (HCC): ICD-10-CM

## 2022-04-15 NOTE — TELEPHONE ENCOUNTER
----- Message from Sydnee Miller sent at 4/15/2022  8:21 AM EDT -----  Subject: Referral Request    QUESTIONS   Reason for referral request? Patient requesting LABs before annual   physical scheduled for 4-   Has the physician seen you for this condition before? Yes  Select a date? 2019-12-13  Select the Provider the patient wants to be referred to, if known (PCP or   Specialist)? Funmi Jennings   Preferred Specialist (if applicable)? Do you already have an appointment scheduled? Additional Information for Provider?   ---------------------------------------------------------------------------  --------------  CALL BACK INFO  What is the best way for the office to contact you? OK to leave message on   voicemail  Preferred Call Back Phone Number? [phone]  ---------------------------------------------------------------------------  --------------  SCRIPT ANSWERS  Relationship to Patient?  Self

## 2022-04-16 ENCOUNTER — HOSPITAL ENCOUNTER (OUTPATIENT)
Age: 42
Discharge: HOME | End: 2022-04-16
Payer: COMMERCIAL

## 2022-04-16 DIAGNOSIS — G61.0 GUILLAIN-BARRE SYNDROME (HCC): ICD-10-CM

## 2022-04-16 DIAGNOSIS — Z11.59 SCREENING FOR VIRAL DISEASE: ICD-10-CM

## 2022-04-16 DIAGNOSIS — E55.9 VITAMIN D DEFICIENCY: ICD-10-CM

## 2022-04-16 LAB
A/G RATIO: 1.8 (ref 1.1–2.2)
ALBUMIN SERPL-MCNC: 4.7 G/DL (ref 3.4–5)
ALP BLD-CCNC: 70 U/L (ref 40–129)
ALT SERPL-CCNC: 24 U/L (ref 10–40)
ANION GAP SERPL CALCULATED.3IONS-SCNC: 14 MMOL/L (ref 3–16)
AST SERPL-CCNC: 18 U/L (ref 15–37)
BILIRUB SERPL-MCNC: 0.4 MG/DL (ref 0–1)
BUN BLDV-MCNC: 12 MG/DL (ref 7–20)
CALCIUM SERPL-MCNC: 9 MG/DL (ref 8.3–10.6)
CHLORIDE BLD-SCNC: 105 MMOL/L (ref 99–110)
CHOLESTEROL, FASTING: 221 MG/DL (ref 0–199)
CO2: 25 MMOL/L (ref 21–32)
CREAT SERPL-MCNC: 0.8 MG/DL (ref 0.9–1.3)
GFR AFRICAN AMERICAN: >60
GFR NON-AFRICAN AMERICAN: >60
GLUCOSE FASTING: 100 MG/DL (ref 70–99)
HDLC SERPL-MCNC: 42 MG/DL (ref 40–60)
HEPATITIS C ANTIBODY INTERPRETATION: NORMAL
LDL CHOLESTEROL CALCULATED: 146 MG/DL
POTASSIUM SERPL-SCNC: 5 MMOL/L (ref 3.5–5.1)
SODIUM BLD-SCNC: 144 MMOL/L (ref 136–145)
TOTAL PROTEIN: 7.3 G/DL (ref 6.4–8.2)
TRIGLYCERIDE, FASTING: 164 MG/DL (ref 0–150)
VITAMIN D 25-HYDROXY: 24 NG/ML
VLDLC SERPL CALC-MCNC: 33 MG/DL

## 2022-04-16 PROCEDURE — 86803 HEPATITIS C AB TEST: CPT

## 2022-04-16 PROCEDURE — 86701 HIV-1ANTIBODY: CPT

## 2022-04-16 PROCEDURE — 36415 COLL VENOUS BLD VENIPUNCTURE: CPT

## 2022-04-16 PROCEDURE — 86702 HIV-2 ANTIBODY: CPT

## 2022-04-16 PROCEDURE — 82306 VITAMIN D 25 HYDROXY: CPT

## 2022-04-16 PROCEDURE — 85025 COMPLETE CBC W/AUTO DIFF WBC: CPT

## 2022-04-16 PROCEDURE — 87390 HIV-1 AG IA: CPT

## 2022-04-16 PROCEDURE — 80053 COMPREHEN METABOLIC PANEL: CPT

## 2022-04-16 PROCEDURE — 80061 LIPID PANEL: CPT

## 2022-04-17 LAB
HIV AG/AB: NORMAL
HIV ANTIGEN: NORMAL
HIV-1 ANTIBODY: NORMAL
HIV-2 AB: NORMAL

## 2022-04-18 LAB
BASOPHILS ABSOLUTE: 0 K/UL (ref 0–0.2)
BASOPHILS RELATIVE PERCENT: 0.7 %
EOSINOPHILS ABSOLUTE: 0.1 K/UL (ref 0–0.6)
EOSINOPHILS RELATIVE PERCENT: 1.6 %
HCT VFR BLD CALC: 45 % (ref 40.5–52.5)
HEMATOLOGY PATH CONSULT: NO
HEMOGLOBIN: 13.9 G/DL (ref 13.5–17.5)
LYMPHOCYTES ABSOLUTE: 2.1 K/UL (ref 1–5.1)
LYMPHOCYTES RELATIVE PERCENT: 44.2 %
MCH RBC QN AUTO: 20.7 PG (ref 26–34)
MCHC RBC AUTO-ENTMCNC: 30.8 G/DL (ref 31–36)
MCV RBC AUTO: 67.1 FL (ref 80–100)
MONOCYTES ABSOLUTE: 0.3 K/UL (ref 0–1.3)
MONOCYTES RELATIVE PERCENT: 7.1 %
NEUTROPHILS ABSOLUTE: 2.2 K/UL (ref 1.7–7.7)
NEUTROPHILS RELATIVE PERCENT: 46.4 %
PDW BLD-RTO: 15.2 % (ref 12.4–15.4)
PLATELET # BLD: 327 K/UL (ref 135–450)
PMV BLD AUTO: 7 FL (ref 5–10.5)
RBC # BLD: 6.7 M/UL (ref 4.2–5.9)
WBC # BLD: 4.7 K/UL (ref 4–11)

## 2022-04-20 ENCOUNTER — OFFICE VISIT (OUTPATIENT)
Dept: PRIMARY CARE CLINIC | Age: 42
End: 2022-04-20
Payer: COMMERCIAL

## 2022-04-20 VITALS
BODY MASS INDEX: 23.66 KG/M2 | TEMPERATURE: 97.3 F | DIASTOLIC BLOOD PRESSURE: 82 MMHG | HEIGHT: 71 IN | WEIGHT: 169 LBS | OXYGEN SATURATION: 97 % | SYSTOLIC BLOOD PRESSURE: 134 MMHG | HEART RATE: 105 BPM

## 2022-04-20 DIAGNOSIS — G61.0 GUILLAIN-BARRE SYNDROME (HCC): ICD-10-CM

## 2022-04-20 DIAGNOSIS — D56.1 BETA THALASSEMIA (HCC): ICD-10-CM

## 2022-04-20 DIAGNOSIS — Z00.00 WELL ADULT EXAM: Primary | ICD-10-CM

## 2022-04-20 DIAGNOSIS — E55.9 VITAMIN D DEFICIENCY: ICD-10-CM

## 2022-04-20 PROCEDURE — 99386 PREV VISIT NEW AGE 40-64: CPT | Performed by: INTERNAL MEDICINE

## 2022-04-20 RX ORDER — ACETAMINOPHEN 500 MG
500 TABLET ORAL EVERY 6 HOURS PRN
COMMUNITY

## 2022-04-20 RX ORDER — MULTIVIT-MIN/IRON/FOLIC ACID/K 18-600-40
2 CAPSULE ORAL DAILY
Qty: 30 CAPSULE | Refills: 11
Start: 2022-04-20

## 2022-04-20 ASSESSMENT — PATIENT HEALTH QUESTIONNAIRE - PHQ9
SUM OF ALL RESPONSES TO PHQ QUESTIONS 1-9: 0
1. LITTLE INTEREST OR PLEASURE IN DOING THINGS: 0
SUM OF ALL RESPONSES TO PHQ9 QUESTIONS 1 & 2: 0
SUM OF ALL RESPONSES TO PHQ QUESTIONS 1-9: 0
2. FEELING DOWN, DEPRESSED OR HOPELESS: 0
SUM OF ALL RESPONSES TO PHQ QUESTIONS 1-9: 0
SUM OF ALL RESPONSES TO PHQ QUESTIONS 1-9: 0

## 2022-04-20 NOTE — PROGRESS NOTES
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   Macie Madison  1980    Past Medical History:   Diagnosis Date    [facility])      Past Surgical History:   Procedure Laterality Date    INNER EAR SURGERY      OTHER SURGICAL HISTORY  10/29/12    port removal    TUNNELED VENOUS CATHETER PLACEMENT  10/16/12    right subclavian      Family History   Problem Relation Age of Onset    Diabetes Maternal Grandmother     Heart Disease Maternal Grandfather       Social History     Socioeconomic History    Marital status:      Spouse name: Not on file    Number of children: Not on file    Years of education: Not on file    Highest education level: Not on file   Occupational History    Not on file   Tobacco Use    Smoking status: Never Smoker    Smokeless tobacco: Never Used   Substance and Sexual Activity    Alcohol use: Yes     Alcohol/week: 0.0 standard drinks     Comment: rre    Drug use: No    Sexual activity: Yes     Partners: Female   Other Topics Concern    Not on file   Social History Narrative    Not on file     Social Determinants of Health     Financial Resource Strain:     Difficulty of Paying Living Expenses: Not on file   Food Insecurity:     Worried About Running Out of Food in the Last Year: Not on file    Eula of Food in the Last Year: Not on file   Transportation Needs:     Lack of Transportation (Medical): Not on file    Lack of Transportation (Non-Medical):  Not on file   Physical Activity:     Days of Exercise per Week: Not on file    Minutes of Exercise per Session: Not on file   Stress:     Feeling of Stress : Not on file   Social Connections:     Frequency of Communication with Friends and Family: Not on file    Frequency of Social Gatherings with Friends and Family: Not on file    Attends Hoahaoism Services: Not on file    Active Member of Clubs or Organizations: Not on file    Attends Club or Organization Meetings: Not on file    Marital Status: Not Trachea: No tracheal deviation. Cardiovascular:      Rate and Rhythm: Normal rate and regular rhythm. Heart sounds: Normal heart sounds. No murmur heard. Pulmonary:      Effort: Pulmonary effort is normal. No respiratory distress. Breath sounds: Normal breath sounds. No wheezing or rales. Abdominal:      General: Bowel sounds are normal. There is no distension. Palpations: Abdomen is soft. Tenderness: There is no abdominal tenderness. There is no guarding or rebound. Musculoskeletal:         General: No tenderness. Normal range of motion. Cervical back: Normal range of motion and neck supple. Comments: Wears ankle brace   Lymphadenopathy:      Cervical: No cervical adenopathy. Skin:     General: Skin is warm and dry. Findings: No erythema or rash. Neurological:      Mental Status: He is alert and oriented to person, place, and time. Cranial Nerves: No cranial nerve deficit. Coordination: Coordination normal.      Deep Tendon Reflexes: Reflexes abnormal.      Comments: 1+ reflexes both knees   Psychiatric:         Behavior: Behavior normal.         Thought Content:  Thought content normal.         Data Review:  Lab Results   Component Value Date    CHOL 207 (H) 06/26/2017    CHOL 189 06/28/2016    CHOL 209 (H) 01/11/2016     Lab Results   Component Value Date    TRIG 179 (H) 06/26/2017    TRIG 197 (H) 06/28/2016    TRIG 147 01/11/2016     Lab Results   Component Value Date    HDL 42 04/16/2022    HDL 46 12/21/2019    HDL 43 06/26/2017     Lab Results   Component Value Date    LDLCALC 146 (H) 04/16/2022    LDLCALC 139 (H) 12/21/2019    LDLCALC 128 (H) 06/26/2017     Lab Results   Component Value Date    LABVLDL 33 04/16/2022    LABVLDL 24 12/21/2019    LABVLDL 36 06/26/2017     No results found for: CHOLHDLRATIO   Lab Results   Component Value Date    WBC 4.7 04/16/2022    HGB 13.9 04/16/2022    HCT 45.0 04/16/2022    MCV 67.1 (L) 04/16/2022     04/16/2022     Lab Results   Component Value Date     04/16/2022    K 5.0 04/16/2022     04/16/2022    CO2 25 04/16/2022    BUN 12 04/16/2022    CREATININE 0.8 (L) 04/16/2022    GLUCOSE 88 06/26/2017    CALCIUM 9.0 04/16/2022    PROT 7.3 04/16/2022    LABALBU 4.7 04/16/2022    BILITOT 0.4 04/16/2022    ALKPHOS 70 04/16/2022    AST 18 04/16/2022    ALT 24 04/16/2022    LABGLOM >60 04/16/2022    GFRAA >60 04/16/2022    AGRATIO 1.8 04/16/2022    GLOB 2.7 12/21/2019       ASSESSMENT/PLAN  1. Well adult exam  Pt prefers to return annually for visits    2. 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 Vitamin D deficiency  Monitor Vitamin D levels at least annually, tke 2000 IU daily    4. Beta thalassemia (HCC)  No further testing needed for microcytosis on CBC    5.) Tylenol made dizziness better    Repeat labs ordered again in 1 year  Discussed colon CA screening options at age 37, ?  Deisy Peterson MD          Electronically signed by Woodrow Fuentes MD on 4/20/2022 at 11:44 AM

## 2022-04-20 NOTE — ADDENDUM NOTE
Addended by: Ammon [facility] on: 4/20/2022 03:20 PM     Modules accepted: Orders

## 2022-04-20 NOTE — LETTER
[address] [facility] [address]  Phone: [phone]  Fax: [phone]    Yohan Grey MD        April 20, 2022     Patient: Dimitri Price   YOB: 1980   Date of Visit: 4/20/2022       To Whom It May Concern: It is my medical opinion that Dimitri Price may return to full duty immediately with no restrictions. If you have any questions or concerns, please don't hesitate to call.     Sincerely,        Yohan Grey MD

## 2023-02-01 ENCOUNTER — TELEPHONE (OUTPATIENT)
Dept: PRIMARY CARE CLINIC | Age: 43
End: 2023-02-01

## 2023-02-01 ENCOUNTER — OFFICE VISIT (OUTPATIENT)
Dept: ENT CLINIC | Age: 43
End: 2023-02-01
Payer: COMMERCIAL

## 2023-02-01 ENCOUNTER — OFFICE VISIT (OUTPATIENT)
Dept: PRIMARY CARE CLINIC | Age: 43
End: 2023-02-01
Payer: COMMERCIAL

## 2023-02-01 VITALS
SYSTOLIC BLOOD PRESSURE: 132 MMHG | TEMPERATURE: 98.1 F | OXYGEN SATURATION: 99 % | DIASTOLIC BLOOD PRESSURE: 80 MMHG | WEIGHT: 169 LBS | HEART RATE: 60 BPM | BODY MASS INDEX: 23.57 KG/M2

## 2023-02-01 VITALS — SYSTOLIC BLOOD PRESSURE: 135 MMHG | TEMPERATURE: 97.5 F | DIASTOLIC BLOOD PRESSURE: 80 MMHG

## 2023-02-01 DIAGNOSIS — G56.01 CARPAL TUNNEL SYNDROME OF RIGHT WRIST: ICD-10-CM

## 2023-02-01 DIAGNOSIS — D56.1 BETA THALASSEMIA (HCC): ICD-10-CM

## 2023-02-01 DIAGNOSIS — H90.A11 CONDUCTIVE HEARING LOSS OF RIGHT EAR WITH RESTRICTED HEARING OF LEFT EAR: Chronic | ICD-10-CM

## 2023-02-01 DIAGNOSIS — E55.9 VITAMIN D DEFICIENCY: ICD-10-CM

## 2023-02-01 DIAGNOSIS — H72.01 CENTRAL PERFORATION OF TYMPANIC MEMBRANE, RIGHT EAR: Chronic | ICD-10-CM

## 2023-02-01 DIAGNOSIS — G61.0 GUILLAIN-BARRE SYNDROME (HCC): Primary | ICD-10-CM

## 2023-02-01 DIAGNOSIS — H66.014 RECURRENT ACUTE SUPPURATIVE OTITIS MEDIA OF RIGHT EAR WITH SPONTANEOUS RUPTURE OF TYMPANIC MEMBRANE: Primary | ICD-10-CM

## 2023-02-01 DIAGNOSIS — G56.01 CARPAL TUNNEL SYNDROME OF RIGHT WRIST: Primary | ICD-10-CM

## 2023-02-01 PROCEDURE — 99213 OFFICE O/P EST LOW 20 MIN: CPT | Performed by: INTERNAL MEDICINE

## 2023-02-01 PROCEDURE — 99214 OFFICE O/P EST MOD 30 MIN: CPT | Performed by: OTOLARYNGOLOGY

## 2023-02-01 RX ORDER — CIPROFLOXACIN AND DEXAMETHASONE 3; 1 MG/ML; MG/ML
4 SUSPENSION/ DROPS AURICULAR (OTIC) 2 TIMES DAILY
Qty: 1 EACH | Refills: 0 | Status: SHIPPED | OUTPATIENT
Start: 2023-02-01 | End: 2023-02-11

## 2023-02-01 ASSESSMENT — PATIENT HEALTH QUESTIONNAIRE - PHQ9
SUM OF ALL RESPONSES TO PHQ QUESTIONS 1-9: 0
SUM OF ALL RESPONSES TO PHQ QUESTIONS 1-9: 0
2. FEELING DOWN, DEPRESSED OR HOPELESS: 0
SUM OF ALL RESPONSES TO PHQ QUESTIONS 1-9: 0
1. LITTLE INTEREST OR PLEASURE IN DOING THINGS: 0
SUM OF ALL RESPONSES TO PHQ9 QUESTIONS 1 & 2: 0
SUM OF ALL RESPONSES TO PHQ QUESTIONS 1-9: 0

## 2023-02-01 ASSESSMENT — ENCOUNTER SYMPTOMS
SINUS PAIN: 0
SORE THROAT: 0
RHINORRHEA: 0

## 2023-02-01 NOTE — TELEPHONE ENCOUNTER
----- Message from Elisa Roach sent at 2/1/2023 10:59 AM EST -----  Subject: Referral Request    Reason for referral request? EMG for Right Arm & Nerve conduction test  Provider patient wants to be referred to(if known):     Provider Phone Number(if known): Additional Information for Provider? Patient stated the wrong thing was   given. Please correct and resend.  Please advise  ---------------------------------------------------------------------------  --------------  Rena Thorne INFO    [phone]; OK to leave message on voicemail  ---------------------------------------------------------------------------  --------------

## 2023-02-01 NOTE — LETTER
[address] [facility] [address]  Phone: [phone]  Fax: [phone]    Norma Serrano MD        February 1, 2023     Patient: Paulo Castillo   YOB: 1980   Date of Visit: 2/1/2023       To Whom It May Concern: It is my medical opinion that Paulo Castillo may return to work on 2/2/2023. If you have any questions or concerns, please don't hesitate to call.     Sincerely,        Norma Serrano MD

## 2023-02-01 NOTE — PROGRESS NOTES
[facility]          PCP:  Emperatriz Goldsmith MD      CHIEF COMPLAINT  No chief complaint on file. HISTORY OF PRESENT ILLNESS    Macie Beckman is a 38 y.o. male   Feels like ear infection the same as previous infection. Ear has drainage. Started two weeks with drainage, no pain      REVIEW OF SYSTEMS   Review of Systems   Constitutional:  Negative for chills and fever. HENT:  Positive for ear discharge and hearing loss (chronic and currently not worse than usual). Negative for ear pain, rhinorrhea, sinus pain, sore throat and tinnitus. PAST MEDICAL HISTORY    Past Medical History:   Diagnosis Date    [facility]ain-[facility])          Past Surgical History:   Procedure Laterality Date    INNER EAR SURGERY      OTHER SURGICAL HISTORY  10/29/12    port removal    TUNNELED VENOUS CATHETER PLACEMENT  10/16/12    right subclavian         EXAMINATION    Vitals:    02/01/23 1251   BP: 135/80   Temp: 97.5 °F (36.4 °C)   TempSrc: Temporal     General:  WDWN, NAD, alert and oriented  Ears:  AD TM with large marginal inferior perforation with granulation tissue along the inferior margin and some dried crusted exudate in the EAC removed with wire loop and suction. Middle ear mucosa was mildly erythematous and edematous with no ROBI or exudate. The right EAC with erythema and exudate. Cipro ophthalmic solution was instilled and CB placed. Right TM with large monomeric membrane inferior area c/w healed perforation and no evidence of acute disease. The right external ear and bilateral mastoids appeared to be normal.  Binaural binocular otomicroscopy was performed.             JAYLON / Chen Leonard / Quynh Contreras       Diagnoses and all orders for this visit:    Recurrent acute suppurative otitis media of right ear with spontaneous rupture of tympanic membrane  -     ciprofloxacin-dexamethasone (CIPRODEX) 0.3-0.1 % otic suspension; Place 4 drops into the right ear 2 times daily for 10 days    Central perforation of tympanic membrane, right ear  Comments:  near marginal    Conductive hearing loss of right ear with restricted hearing of left ear           RECOMMENDATIONS/PLAN      Ciprodex carlo suspension. Tympanoplasty and possible ossicular reconstruction was discussed. Patient declined but stated that he may consider next year. Return if symptoms worsen or fail to clear up with treatment, or, for any further ENT or sinus problems or symptoms. Patient Instructions   WATER PRECAUTIONS  Do not allow water to get into your right ear, as this may cause, or worsen, an ear infection. Before bathing, showering or washing your hair, a plug of cotton coated with petroleum jelly should be placed in the opening of your ear canal.  After bathing the cotton should be removed and the outer part of your ear dried with a soft towel. Alternatively, you may use a silicone putty ear plug purchased from your pharmacy. Swimming may be permitted if you wear adequate ear plugs. If water does enter your ear, drain the water out into a tissue or cotton ball. Then, instill into your ear four drops of the eardrops you were prescribed. Call the office if you develop develops pain or drainage.

## 2023-02-01 NOTE — PATIENT INSTRUCTIONS
WATER PRECAUTIONS  Do not allow water to get into your right ear, as this may cause, or worsen, an ear infection. Before bathing, showering or washing your hair, a plug of cotton coated with petroleum jelly should be placed in the opening of your ear canal.  After bathing the cotton should be removed and the outer part of your ear dried with a soft towel. Alternatively, you may use a silicone putty ear plug purchased from your pharmacy. Swimming may be permitted if you wear adequate ear plugs. If water does enter your ear, drain the water out into a tissue or cotton ball. Then, instill into your ear four drops of the eardrops you were prescribed. Call the office if you develop develops pain or drainage.

## 2023-02-01 NOTE — PROGRESS NOTES
2/1/2023   Macie Madison  1980    The patients PMH, surgical history, family history, medications, allergies were all reviewed and updated as appropriate today. Current Outpatient Medications on File Prior to Visit   Medication Sig Dispense Refill    acetaminophen (TYLENOL) 500 MG tablet Take 500 mg by mouth every 6 hours as needed for Pain      Cholecalciferol (VITAMIN D) 50 MCG (2000 UT) CAPS capsule Take 2 capsules by mouth daily 30 capsule 11     No current facility-administered medications on file prior to visit. Chief Complaint   Patient presents with    Pain     Pt C/o pain in right hand that radiates into Right shoulder. Pt states they have some muscle and nerve pain. X 3 weeks          HPI:  worried about possible CTS R LE x 3 weeks, has happened before  Needs work excuse for today  H/o Harpreet Sanders, needs new Neurology referral since Dr. Weston Callow no longer available    Review of Systems    OBJECTIVE:  BP (!) 146/80   Pulse 60   Temp 98.1 °F (36.7 °C) (Infrared)   Wt 169 lb (76.7 kg)   SpO2 99%   BMI 23.57 kg/m²     Physical Exam  Vitals and nursing note reviewed. Constitutional:       General: He is not in acute distress. Appearance: He is well-developed. Comments: BP (!) 146/80   Pulse 60   Temp 98.1 °F (36.7 °C) (Infrared)   Wt 169 lb (76.7 kg)   SpO2 99%   BMI 23.57 kg/m²    HENT:      Head: Normocephalic and atraumatic. Eyes:      General: No scleral icterus. Right eye: No discharge. Left eye: No discharge. Conjunctiva/sclera: Conjunctivae normal.      Pupils: Pupils are equal, round, and reactive to light. Neck:      Thyroid: No thyromegaly. Vascular: No JVD. Trachea: No tracheal deviation. Cardiovascular:      Rate and Rhythm: Normal rate and regular rhythm. Heart sounds: Normal heart sounds. No murmur heard. Pulmonary:      Effort: Pulmonary effort is normal. No respiratory distress.       Breath sounds: Normal breath sounds. No wheezing or rales. Abdominal:      General: Bowel sounds are normal. There is no distension. Palpations: Abdomen is soft. Tenderness: There is no abdominal tenderness. There is no guarding or rebound. Musculoskeletal:         General: Normal range of motion. Cervical back: Normal range of motion and neck supple. Comments: Upper extemity strength is conserved    Still has unsteady gait from chronic Guillain Manter   Lymphadenopathy:      Cervical: No cervical adenopathy. Skin:     General: Skin is warm and dry. Findings: No erythema or rash. Neurological:      Mental Status: He is alert and oriented to person, place, and time. Cranial Nerves: No cranial nerve deficit. Coordination: Coordination normal.      Deep Tendon Reflexes: Reflexes normal.   Psychiatric:         Behavior: Behavior normal.         Thought Content:  Thought content normal.       Data Review:   CBC:   Lab Results   Component Value Date/Time    WBC 4.7 04/16/2022 07:05 AM    WBC 5.3 12/21/2019 07:09 AM    WBC 4.9 06/26/2017 06:35 AM    HGB 13.9 04/16/2022 07:05 AM    HGB 15.0 12/21/2019 07:09 AM    HGB 14.7 06/26/2017 06:35 AM    HCT 45.0 04/16/2022 07:05 AM    HCT 47.6 12/21/2019 07:09 AM    HCT 47.1 06/26/2017 06:35 AM    MCV 67.1 04/16/2022 07:05 AM    MCV 67.4 12/21/2019 07:09 AM    MCV 67.6 06/26/2017 06:35 AM     04/16/2022 07:05 AM     12/21/2019 07:09 AM     06/26/2017 06:35 AM     Chemistry:   Lab Results   Component Value Date/Time     04/16/2022 07:05 AM     12/21/2019 07:09 AM     06/26/2017 06:35 AM    K 5.0 04/16/2022 07:05 AM    K 4.5 12/23/2019 02:59 PM    K 5.5 12/21/2019 07:09 AM     04/16/2022 07:05 AM     12/21/2019 07:09 AM     06/26/2017 06:35 AM    CO2 25 04/16/2022 07:05 AM    CO2 26 12/21/2019 07:09 AM    CO2 25 06/26/2017 06:35 AM    BUN 12 04/16/2022 07:05 AM    BUN 16 12/21/2019 07:09 AM    BUN 11 06/26/2017 06:35 AM    CREATININE 0.8 04/16/2022 07:05 AM    CREATININE 1.0 12/21/2019 07:09 AM    CREATININE 0.8 06/26/2017 06:35 AM     Hepatic Function:   Lab Results   Component Value Date/Time    AST 18 04/16/2022 07:05 AM    AST 18 12/21/2019 07:09 AM    AST 21 06/26/2017 06:35 AM    ALT 24 04/16/2022 07:05 AM    ALT 21 12/21/2019 07:09 AM    ALT 26 06/26/2017 06:35 AM    BILIDIR 0.10 10/10/2012 01:10 PM    BILITOT 0.4 04/16/2022 07:05 AM    BILITOT 0.3 12/21/2019 07:09 AM    BILITOT 0.5 06/26/2017 06:35 AM    ALKPHOS 70 04/16/2022 07:05 AM    ALKPHOS 69 12/21/2019 07:09 AM    ALKPHOS 60 06/26/2017 06:35 AM     No results found for: LIPASE, AMYLASE  Lipids:   Lab Results   Component Value Date/Time    CHOL 207 06/26/2017 06:35 AM    HDL 42 04/16/2022 07:05 AM    TRIG 179 06/26/2017 06:35 AM       ASSESSMENT/PLAN  1.) Probable CTS R UE- check EMG/NCS    2.) H/o [facility] [address] work excuse for today    Higinio Peterson MD        Electronically signed by Higinio Peterson MD on 2/1/2023 at 9:36 AM

## 2023-02-01 NOTE — TELEPHONE ENCOUNTER
Central scheduling called and needs the order for the EMG changed to upper extremities not lower extremities.  Thank you

## 2023-03-03 ENCOUNTER — PROCEDURE VISIT (OUTPATIENT)
Dept: NEUROLOGY | Age: 43
End: 2023-03-03

## 2023-03-03 DIAGNOSIS — G56.01 CARPAL TUNNEL SYNDROME OF RIGHT WRIST: ICD-10-CM

## 2023-03-03 NOTE — PROGRESS NOTES
Bunny Allison M.D.  [facility]  Board Certified in Neurology & Electromyography  [facility], [address]     [place]  [address]    EMG / NERVE CONDUCTION STUDY      PATIENT:  Macie Madison       DATE OF EM23     YOB: 1980       REASON FOR EMG:   Right arm pain and numbness      REFERRING PHYSICIAN:  Adonay Burgos MD  [address] [place] [address]     SUMMARY:   The right median motor and sensory nerve studies had prolonged distal latencies  The right ulnar motor nerve study had a slowing of conduction velocity across the elbow  The right ulnar and radial sensory nerve studies were normal  Needle EMG of several muscles in the right upper extremity was normal      CLINICAL DIAGNOSIS:  Unspecified neuropathy        EMG RESULTS:     1.  This patient has a mild to moderate right median nerve lesion at the wrist.  (Carpal tunnel syndrome).    2.  This patient also has a moderately severe right ulnar nerve lesion at the elbow.        ---------------------------------------------  Bunny Allison M.D.  Electromyographer / Neurologist

## 2023-03-09 ENCOUNTER — TELEPHONE (OUTPATIENT)
Dept: PRIMARY CARE CLINIC | Age: 43
End: 2023-03-09

## 2023-03-09 NOTE — TELEPHONE ENCOUNTER
----- Message from Joelanny Waltmeño sent at 3/9/2023 10:46 AM EST -----  Subject: Results Request    QUESTIONS  Results: EMG; Ordered by: Rudi Rosa   Date Performed: 2023-03-06  ---------------------------------------------------------------------------  --------------  Gogo De Souza INFO    [phone];  Do not leave any message, patient will call back for answer  ---------------------------------------------------------------------------  --------------

## 2023-03-30 ENCOUNTER — TELEPHONE (OUTPATIENT)
Dept: PRIMARY CARE CLINIC | Age: 43
End: 2023-03-30

## 2023-03-30 NOTE — TELEPHONE ENCOUNTER
----- Message from Sergio Nina Dr sent at 3/28/2023  3:28 PM EDT -----  Subject: Message to Provider    QUESTIONS  Information for Provider? Patient calling regarding Disability papers he   dropped off last thursday 3/23 around 4pm. He is wondering if that is   completed and can he pick it up this week. Please call to discuss. ---------------------------------------------------------------------------  --------------  Ann Marie MACE  [phone]; Do not leave any message, patient will call back for answer  ---------------------------------------------------------------------------  --------------  SCRIPT ANSWERS  Relationship to Patient?  Self

## 2023-04-01 ENCOUNTER — HOSPITAL ENCOUNTER (OUTPATIENT)
Age: 43
Discharge: HOME | End: 2023-04-01
Payer: COMMERCIAL

## 2023-04-01 DIAGNOSIS — D56.1 BETA THALASSEMIA (HCC): ICD-10-CM

## 2023-04-01 DIAGNOSIS — G61.0 GUILLAIN-BARRE SYNDROME (HCC): ICD-10-CM

## 2023-04-01 DIAGNOSIS — Z00.00 WELL ADULT EXAM: ICD-10-CM

## 2023-04-01 DIAGNOSIS — E55.9 VITAMIN D DEFICIENCY: ICD-10-CM

## 2023-04-01 LAB
25(OH)D3 SERPL-MCNC: 39.2 NG/ML
ALBUMIN SERPL-MCNC: 4.6 G/DL (ref 3.4–5)
ALBUMIN/GLOB SERPL: 1.5 {RATIO} (ref 1.1–2.2)
ALP SERPL-CCNC: 70 U/L (ref 40–129)
ALT SERPL-CCNC: 19 U/L (ref 10–40)
ANION GAP SERPL CALCULATED.3IONS-SCNC: 11 MMOL/L (ref 3–16)
AST SERPL-CCNC: 20 U/L (ref 15–37)
BASOPHILS # BLD: 0 K/UL (ref 0–0.2)
BASOPHILS NFR BLD: 0.8 %
BILIRUB SERPL-MCNC: 0.5 MG/DL (ref 0–1)
BUN SERPL-MCNC: 13 MG/DL (ref 7–20)
CALCIUM SERPL-MCNC: 9.3 MG/DL (ref 8.3–10.6)
CHLORIDE SERPL-SCNC: 102 MMOL/L (ref 99–110)
CHOLEST SERPL-MCNC: 255 MG/DL (ref 0–199)
CO2 SERPL-SCNC: 26 MMOL/L (ref 21–32)
CREAT SERPL-MCNC: 0.9 MG/DL (ref 0.9–1.3)
DEPRECATED RDW RBC AUTO: 15.5 % (ref 12.4–15.4)
EOSINOPHIL # BLD: 0.1 K/UL (ref 0–0.6)
EOSINOPHIL NFR BLD: 1.4 %
GFR SERPLBLD CREATININE-BSD FMLA CKD-EPI: >60 ML/MIN/{1.73_M2}
GLUCOSE P FAST SERPL-MCNC: 95 MG/DL (ref 70–99)
HCT VFR BLD AUTO: 45.5 % (ref 40.5–52.5)
HDLC SERPL-MCNC: 43 MG/DL (ref 40–60)
HGB BLD-MCNC: 14.4 G/DL (ref 13.5–17.5)
LDL CHOLESTEROL CALCULATED: 166 MG/DL
LYMPHOCYTES # BLD: 2.2 K/UL (ref 1–5.1)
LYMPHOCYTES NFR BLD: 39.1 %
MCH RBC QN AUTO: 21 PG (ref 26–34)
MCHC RBC AUTO-ENTMCNC: 31.7 G/DL (ref 31–36)
MCV RBC AUTO: 66.2 FL (ref 80–100)
MONOCYTES # BLD: 0.4 K/UL (ref 0–1.3)
MONOCYTES NFR BLD: 7.2 %
NEUTROPHILS # BLD: 2.8 K/UL (ref 1.7–7.7)
NEUTROPHILS NFR BLD: 51.5 %
PATH INTERP BLD-IMP: NO
PLATELET # BLD AUTO: 360 K/UL (ref 135–450)
PMV BLD AUTO: 7.2 FL (ref 5–10.5)
POTASSIUM SERPL-SCNC: 5.1 MMOL/L (ref 3.5–5.1)
PROT SERPL-MCNC: 7.6 G/DL (ref 6.4–8.2)
RBC # BLD AUTO: 6.87 M/UL (ref 4.2–5.9)
SODIUM SERPL-SCNC: 139 MMOL/L (ref 136–145)
TRIGL SERPL-MCNC: 229 MG/DL (ref 0–150)
VLDLC SERPL CALC-MCNC: 46 MG/DL
WBC # BLD AUTO: 5.5 K/UL (ref 4–11)

## 2023-04-01 PROCEDURE — 36415 COLL VENOUS BLD VENIPUNCTURE: CPT

## 2023-04-01 PROCEDURE — 80053 COMPREHEN METABOLIC PANEL: CPT

## 2023-04-01 PROCEDURE — 85025 COMPLETE CBC W/AUTO DIFF WBC: CPT

## 2023-04-01 PROCEDURE — 82306 VITAMIN D 25 HYDROXY: CPT

## 2023-04-01 PROCEDURE — 80061 LIPID PANEL: CPT

## 2023-04-07 ENCOUNTER — OFFICE VISIT (OUTPATIENT)
Dept: PRIMARY CARE CLINIC | Age: 43
End: 2023-04-07
Payer: COMMERCIAL

## 2023-04-07 VITALS
TEMPERATURE: 98.4 F | BODY MASS INDEX: 24.08 KG/M2 | SYSTOLIC BLOOD PRESSURE: 134 MMHG | OXYGEN SATURATION: 98 % | DIASTOLIC BLOOD PRESSURE: 80 MMHG | RESPIRATION RATE: 16 BRPM | HEART RATE: 91 BPM | WEIGHT: 172 LBS | HEIGHT: 71 IN

## 2023-04-07 DIAGNOSIS — E55.9 VITAMIN D DEFICIENCY: Primary | ICD-10-CM

## 2023-04-07 DIAGNOSIS — E78.00 HYPERCHOLESTEROLEMIA: ICD-10-CM

## 2023-04-07 DIAGNOSIS — G61.0 GUILLAIN BARRÉ SYNDROME (HCC): ICD-10-CM

## 2023-04-07 DIAGNOSIS — D56.1 BETA THALASSEMIA (HCC): ICD-10-CM

## 2023-04-07 PROCEDURE — 99214 OFFICE O/P EST MOD 30 MIN: CPT | Performed by: INTERNAL MEDICINE

## 2023-04-07 RX ORDER — GINKGO BILOBA LEAF EXTRACT 60 MG
CAPSULE ORAL
COMMUNITY

## 2023-04-07 RX ORDER — ATORVASTATIN CALCIUM 10 MG/1
10 TABLET, FILM COATED ORAL DAILY
Qty: 30 TABLET | Refills: 3 | Status: SHIPPED | OUTPATIENT
Start: 2023-04-07

## 2023-04-07 NOTE — LETTER
April 7, 2023       Kamron YOB: 1980   [address] [address] Date of Visit:  4/7/2023       To Whom It May Concern:    Kamron was seen in my clinic on 4/7/2023. He may return to work on 04/07/2023 . If you have any questions or concerns, please don't hesitate to call.     Sincerely,        Jose Kelsey MD

## 2023-04-07 NOTE — PROGRESS NOTES
12/21/2019 07:09 AM    K 5.1 04/01/2023 06:58 AM    K 5.0 04/16/2022 07:05 AM    K 4.5 12/23/2019 02:59 PM     04/01/2023 06:58 AM     04/16/2022 07:05 AM     12/21/2019 07:09 AM    CO2 26 04/01/2023 06:58 AM    CO2 25 04/16/2022 07:05 AM    CO2 26 12/21/2019 07:09 AM    BUN 13 04/01/2023 06:58 AM    BUN 12 04/16/2022 07:05 AM    BUN 16 12/21/2019 07:09 AM    CREATININE 0.9 04/01/2023 06:58 AM    CREATININE 0.8 04/16/2022 07:05 AM    CREATININE 1.0 12/21/2019 07:09 AM     Hepatic Function:   Lab Results   Component Value Date/Time    AST 20 04/01/2023 06:58 AM    AST 18 04/16/2022 07:05 AM    AST 18 12/21/2019 07:09 AM    ALT 19 04/01/2023 06:58 AM    ALT 24 04/16/2022 07:05 AM    ALT 21 12/21/2019 07:09 AM    BILIDIR 0.10 10/10/2012 01:10 PM    BILITOT 0.5 04/01/2023 06:58 AM    BILITOT 0.4 04/16/2022 07:05 AM    BILITOT 0.3 12/21/2019 07:09 AM    ALKPHOS 70 04/01/2023 06:58 AM    ALKPHOS 70 04/16/2022 07:05 AM    ALKPHOS 69 12/21/2019 07:09 AM     No results found for: LIPASE, AMYLASE  Lipids:   Lab Results   Component Value Date/Time    CHOL 207 06/26/2017 06:35 AM    HDL 43 04/01/2023 06:58 AM    TRIG 179 06/26/2017 06:35 AM       ASSESSMENT/PLAN  1. Vitamin D deficiency  Vit D level 39.2 with recent labs    2.  Beta thalassemia (HCC)  Explains the microcytosis seen on labs    3.) Edward Hernandez- OK'd FMLA form to be off work for 2 months for [employer]" [facility]    4.) Hypercholesterolemia- advised to start Lipitor 10 and repeat labs in 3 months    5.) use R wrist splint x 2 weeks prn flares of symptoms    Linwood Araiza MD        Electronically signed by Linwood Araiza MD on 4/7/2023 at 2:49 PM

## 2023-07-28 ENCOUNTER — OFFICE VISIT (OUTPATIENT)
Dept: PRIMARY CARE CLINIC | Age: 43
End: 2023-07-28
Payer: COMMERCIAL

## 2023-07-28 VITALS
HEIGHT: 71 IN | HEART RATE: 99 BPM | DIASTOLIC BLOOD PRESSURE: 86 MMHG | BODY MASS INDEX: 23.8 KG/M2 | SYSTOLIC BLOOD PRESSURE: 138 MMHG | WEIGHT: 170 LBS | RESPIRATION RATE: 16 BRPM | OXYGEN SATURATION: 98 %

## 2023-07-28 DIAGNOSIS — E78.00 HYPERCHOLESTEROLEMIA: Primary | ICD-10-CM

## 2023-07-28 PROCEDURE — 99213 OFFICE O/P EST LOW 20 MIN: CPT | Performed by: INTERNAL MEDICINE

## 2023-07-28 RX ORDER — ATORVASTATIN CALCIUM 10 MG/1
10 TABLET, FILM COATED ORAL DAILY
Qty: 90 TABLET | Refills: 1 | Status: SHIPPED | OUTPATIENT
Start: 2023-07-28

## 2023-07-28 ASSESSMENT — PATIENT HEALTH QUESTIONNAIRE - PHQ9
2. FEELING DOWN, DEPRESSED OR HOPELESS: 0
1. LITTLE INTEREST OR PLEASURE IN DOING THINGS: 0
SUM OF ALL RESPONSES TO PHQ QUESTIONS 1-9: 0
SUM OF ALL RESPONSES TO PHQ9 QUESTIONS 1 & 2: 0
SUM OF ALL RESPONSES TO PHQ QUESTIONS 1-9: 0

## 2023-09-21 ENCOUNTER — OFFICE VISIT (OUTPATIENT)
Dept: ENT CLINIC | Age: 43
End: 2023-09-21
Payer: COMMERCIAL

## 2023-09-21 VITALS
HEART RATE: 81 BPM | WEIGHT: 166 LBS | DIASTOLIC BLOOD PRESSURE: 87 MMHG | OXYGEN SATURATION: 98 % | HEIGHT: 71 IN | BODY MASS INDEX: 23.24 KG/M2 | SYSTOLIC BLOOD PRESSURE: 130 MMHG | TEMPERATURE: 98.6 F

## 2023-09-21 DIAGNOSIS — H66.11 CHRONIC TUBOTYMPANIC SUPPURATIVE OTITIS MEDIA, RIGHT EAR: Chronic | ICD-10-CM

## 2023-09-21 DIAGNOSIS — H66.014 RECURRENT ACUTE SUPPURATIVE OTITIS MEDIA OF RIGHT EAR WITH SPONTANEOUS RUPTURE OF TYMPANIC MEMBRANE: Primary | ICD-10-CM

## 2023-09-21 DIAGNOSIS — H72.01 CENTRAL PERFORATION OF TYMPANIC MEMBRANE, RIGHT EAR: Chronic | ICD-10-CM

## 2023-09-21 PROCEDURE — 99214 OFFICE O/P EST MOD 30 MIN: CPT | Performed by: OTOLARYNGOLOGY

## 2023-09-21 RX ORDER — CEFUROXIME AXETIL 250 MG/1
250 TABLET ORAL 2 TIMES DAILY
Qty: 20 TABLET | Refills: 0 | Status: SHIPPED | OUTPATIENT
Start: 2023-09-21 | End: 2023-10-01

## 2023-09-21 RX ORDER — CIPROFLOXACIN AND DEXAMETHASONE 3; 1 MG/ML; MG/ML
4 SUSPENSION/ DROPS AURICULAR (OTIC) 2 TIMES DAILY
Qty: 7.5 ML | Refills: 0 | Status: SHIPPED | OUTPATIENT
Start: 2023-09-21 | End: 2023-10-01

## 2023-09-21 NOTE — PROGRESS NOTES
Chief Complaint   Patient presents with    Ear Drainage        HISTORY OF PRESENT ILLNESS    Archie Lin is a 38 y.o. male who presented today for evaluation and management for the above complaint. \"Right ear drainage, dirty, milky, with no pain, off and on, since June 2023, no decreased hearing. \"  Left ear, \"I feel like something is in there, but the hearing is fine. \"  He stated that he went to the pool in May. REVIEW OF SYSTEMS  Constitutional:  Denied fever and chills. ENT/sinus:  Denied otalgia, nasal pain, rhinorrhea, sore throat, and sinus/facial pain. EXAMINATION    WDWN, NAD  Ears, binaural binocular otomicroscopy:  Right tm with very large perforation with some purulent drainage and erythema of the midde ear mucosa. Drainage was removed with suction and Cipro ophthalmic solution was instilled. Left EAC with Cerumen impaction, removed with wire loop. TM dull, thick, non-eryth with a large monomeric membrane inferior tm with normal pneumatic mobility. TMs, EACs, mastoids and pinnae were normal.        IMPRESSION / DIAGNOSES / ORDERS:   Radha Aragon was seen today for ear drainage. Diagnoses and all orders for this visit:    Recurrent acute suppurative otitis media of right ear with spontaneous rupture of tympanic membrane  -     ciprofloxacin-dexamethasone (CIPRODEX) 0.3-0.1 % otic suspension; Place 4 drops into the right ear 2 times daily for 10 days  -     cefUROXime (CEFTIN) 250 MG tablet; Take 1 tablet by mouth 2 times daily for 10 days    Central perforation of tympanic membrane, right ear  -     ciprofloxacin-dexamethasone (CIPRODEX) 0.3-0.1 % otic suspension; Place 4 drops into the right ear 2 times daily for 10 days    Chronic tubotympanic suppurative otitis media, right ear         RECOMMENDATIONS / PLAN:   Return if symptoms worsen or fail to clear up with treatment, or, for any other ENT or sinus problems or symptoms. Patient still considering ear surgery.

## 2023-10-28 ENCOUNTER — HOSPITAL ENCOUNTER (OUTPATIENT)
Age: 43
Discharge: HOME | End: 2023-10-28
Payer: COMMERCIAL

## 2023-10-28 DIAGNOSIS — E78.00 HYPERCHOLESTEROLEMIA: ICD-10-CM

## 2023-10-28 LAB
ALBUMIN SERPL-MCNC: 4.7 G/DL (ref 3.4–5)
ALBUMIN/GLOB SERPL: 1.7 {RATIO} (ref 1.1–2.2)
ALP SERPL-CCNC: 72 U/L (ref 40–129)
ALT SERPL-CCNC: 32 U/L (ref 10–40)
ANION GAP SERPL CALCULATED.3IONS-SCNC: 9 MMOL/L (ref 3–16)
AST SERPL-CCNC: 22 U/L (ref 15–37)
BASOPHILS # BLD: 0 K/UL (ref 0–0.2)
BASOPHILS NFR BLD: 0.8 %
BILIRUB SERPL-MCNC: 0.5 MG/DL (ref 0–1)
BUN SERPL-MCNC: 12 MG/DL (ref 7–20)
CALCIUM SERPL-MCNC: 9.4 MG/DL (ref 8.3–10.6)
CHLORIDE SERPL-SCNC: 104 MMOL/L (ref 99–110)
CHOLEST SERPL-MCNC: 149 MG/DL (ref 0–199)
CK SERPL-CCNC: 90 U/L (ref 39–308)
CO2 SERPL-SCNC: 27 MMOL/L (ref 21–32)
CREAT SERPL-MCNC: 0.9 MG/DL (ref 0.9–1.3)
DEPRECATED RDW RBC AUTO: 15 % (ref 12.4–15.4)
EOSINOPHIL # BLD: 0.1 K/UL (ref 0–0.6)
EOSINOPHIL NFR BLD: 1.3 %
GFR SERPLBLD CREATININE-BSD FMLA CKD-EPI: >60 ML/MIN/{1.73_M2}
GLUCOSE P FAST SERPL-MCNC: 96 MG/DL (ref 70–99)
HCT VFR BLD AUTO: 46.5 % (ref 40.5–52.5)
HDLC SERPL-MCNC: 43 MG/DL (ref 40–60)
HGB BLD-MCNC: 14.7 G/DL (ref 13.5–17.5)
LDL CHOLESTEROL CALCULATED: 79 MG/DL
LYMPHOCYTES # BLD: 2.5 K/UL (ref 1–5.1)
LYMPHOCYTES NFR BLD: 43.8 %
MCH RBC QN AUTO: 21.1 PG (ref 26–34)
MCHC RBC AUTO-ENTMCNC: 31.7 G/DL (ref 31–36)
MCV RBC AUTO: 66.6 FL (ref 80–100)
MONOCYTES # BLD: 0.3 K/UL (ref 0–1.3)
MONOCYTES NFR BLD: 5.5 %
NEUTROPHILS # BLD: 2.8 K/UL (ref 1.7–7.7)
NEUTROPHILS NFR BLD: 48.6 %
PATH INTERP BLD-IMP: NO
PLATELET # BLD AUTO: 321 K/UL (ref 135–450)
PMV BLD AUTO: 7.2 FL (ref 5–10.5)
POTASSIUM SERPL-SCNC: 5 MMOL/L (ref 3.5–5.1)
PROT SERPL-MCNC: 7.4 G/DL (ref 6.4–8.2)
RBC # BLD AUTO: 6.98 M/UL (ref 4.2–5.9)
SODIUM SERPL-SCNC: 140 MMOL/L (ref 136–145)
TRIGL SERPL-MCNC: 133 MG/DL (ref 0–150)
VLDLC SERPL CALC-MCNC: 27 MG/DL
WBC # BLD AUTO: 5.7 K/UL (ref 4–11)

## 2023-10-28 PROCEDURE — 80053 COMPREHEN METABOLIC PANEL: CPT

## 2023-10-28 PROCEDURE — 85025 COMPLETE CBC W/AUTO DIFF WBC: CPT

## 2023-10-28 PROCEDURE — 82550 ASSAY OF CK (CPK): CPT

## 2023-10-28 PROCEDURE — 80061 LIPID PANEL: CPT

## 2023-10-28 PROCEDURE — 36415 COLL VENOUS BLD VENIPUNCTURE: CPT

## 2023-10-30 ENCOUNTER — TELEPHONE (OUTPATIENT)
Dept: PRIMARY CARE CLINIC | Age: 43
End: 2023-10-30

## 2023-10-30 DIAGNOSIS — Z00.00 WELL ADULT EXAM: Primary | ICD-10-CM

## 2023-10-30 NOTE — TELEPHONE ENCOUNTER
Spoke with pt and he stated that he called our office three times and spoke with someone and told them that he wanted a lab order put in to check blood type. Whoever the pt spoke with informed him that everything was out through but pt stated when he check his chart he did not have that test he was looking for. Pt was upset because he lied to and now he has to go back to the lab again he states.      Please place order

## 2023-10-30 NOTE — TELEPHONE ENCOUNTER
I ordered a blood type but INS usually wont pay for it unless he is requiring a blood transfusion.   Another way to get a free blood type done would be to donate blood at [address] [facility], [place]

## 2023-11-03 ENCOUNTER — OFFICE VISIT (OUTPATIENT)
Dept: PRIMARY CARE CLINIC | Age: 43
End: 2023-11-03
Payer: COMMERCIAL

## 2023-11-03 VITALS — BODY MASS INDEX: 23.51 KG/M2 | WEIGHT: 168.6 LBS | SYSTOLIC BLOOD PRESSURE: 122 MMHG | DIASTOLIC BLOOD PRESSURE: 80 MMHG

## 2023-11-03 DIAGNOSIS — E55.9 VITAMIN D DEFICIENCY: ICD-10-CM

## 2023-11-03 DIAGNOSIS — Z83.3 FAMILY HISTORY OF DIET-CONTROLLED DIABETES: ICD-10-CM

## 2023-11-03 DIAGNOSIS — E78.00 HYPERCHOLESTEROLEMIA: Primary | ICD-10-CM

## 2023-11-03 PROCEDURE — 99213 OFFICE O/P EST LOW 20 MIN: CPT | Performed by: INTERNAL MEDICINE

## 2023-11-03 RX ORDER — ATORVASTATIN CALCIUM 10 MG/1
10 TABLET, FILM COATED ORAL DAILY
Qty: 90 TABLET | Refills: 1 | Status: SHIPPED | OUTPATIENT
Start: 2023-11-03

## 2023-11-03 SDOH — ECONOMIC STABILITY: FOOD INSECURITY: WITHIN THE PAST 12 MONTHS, THE FOOD YOU BOUGHT JUST DIDN'T LAST AND YOU DIDN'T HAVE MONEY TO GET MORE.: NEVER TRUE

## 2023-11-03 SDOH — ECONOMIC STABILITY: INCOME INSECURITY: HOW HARD IS IT FOR YOU TO PAY FOR THE VERY BASICS LIKE FOOD, HOUSING, MEDICAL CARE, AND HEATING?: NOT HARD AT ALL

## 2023-11-03 SDOH — ECONOMIC STABILITY: HOUSING INSECURITY
IN THE LAST 12 MONTHS, WAS THERE A TIME WHEN YOU DID NOT HAVE A STEADY PLACE TO SLEEP OR SLEPT IN A SHELTER (INCLUDING NOW)?: NO

## 2023-11-03 SDOH — ECONOMIC STABILITY: FOOD INSECURITY: WITHIN THE PAST 12 MONTHS, YOU WORRIED THAT YOUR FOOD WOULD RUN OUT BEFORE YOU GOT MONEY TO BUY MORE.: NEVER TRUE

## 2023-11-03 NOTE — PROGRESS NOTES
Hypercholesterolemia  Continue Lipitor 10 QD  Repeat labs in 6 months    2.  Vitamin D deficiency  Check Vit D level with next labs in 6 months     No vaccinations are advised due to Angelique Self in past  Still has wide based gait and some residual leg weakness from the GB     Pt will request copy of medical records prior to upcoming trip to [place]      Electronically signed by Herman Darden MD on 11/3/2023 at 2:59 PM

## 2024-04-22 ENCOUNTER — HOSPITAL ENCOUNTER (OUTPATIENT)
Age: 44
Discharge: HOME | End: 2024-04-22
Payer: COMMERCIAL

## 2024-04-22 DIAGNOSIS — E78.00 HYPERCHOLESTEROLEMIA: ICD-10-CM

## 2024-04-22 DIAGNOSIS — E55.9 VITAMIN D DEFICIENCY: ICD-10-CM

## 2024-04-22 DIAGNOSIS — Z00.00 WELL ADULT EXAM: ICD-10-CM

## 2024-04-22 DIAGNOSIS — Z83.3 FAMILY HISTORY OF DIET-CONTROLLED DIABETES: ICD-10-CM

## 2024-04-22 LAB
25(OH)D3 SERPL-MCNC: 30.4 NG/ML
ABO + RH BLD: NORMAL
ALBUMIN SERPL-MCNC: 4.7 G/DL (ref 3.4–5)
ALBUMIN/GLOB SERPL: 1.9 {RATIO} (ref 1.1–2.2)
ALP SERPL-CCNC: 66 U/L (ref 40–129)
ALT SERPL-CCNC: 29 U/L (ref 10–40)
ANION GAP SERPL CALCULATED.3IONS-SCNC: 10 MMOL/L (ref 3–16)
AST SERPL-CCNC: 21 U/L (ref 15–37)
BASOPHILS # BLD: 0 K/UL (ref 0–0.2)
BASOPHILS NFR BLD: 0.7 %
BILIRUB SERPL-MCNC: 0.6 MG/DL (ref 0–1)
BUN SERPL-MCNC: 11 MG/DL (ref 7–20)
CALCIUM SERPL-MCNC: 9.2 MG/DL (ref 8.3–10.6)
CHLORIDE SERPL-SCNC: 105 MMOL/L (ref 99–110)
CHOLEST SERPL-MCNC: 148 MG/DL (ref 0–199)
CO2 SERPL-SCNC: 26 MMOL/L (ref 21–32)
CREAT SERPL-MCNC: 0.8 MG/DL (ref 0.9–1.3)
DEPRECATED RDW RBC AUTO: 15.2 % (ref 12.4–15.4)
EOSINOPHIL # BLD: 0.1 K/UL (ref 0–0.6)
EOSINOPHIL NFR BLD: 1.1 %
GFR SERPLBLD CREATININE-BSD FMLA CKD-EPI: >90 ML/MIN/{1.73_M2}
GLUCOSE P FAST SERPL-MCNC: 94 MG/DL (ref 70–99)
HCT VFR BLD AUTO: 45.7 % (ref 40.5–52.5)
HDLC SERPL-MCNC: 46 MG/DL (ref 40–60)
HGB BLD-MCNC: 14.3 G/DL (ref 13.5–17.5)
LDL CHOLESTEROL CALCULATED: 67 MG/DL
LYMPHOCYTES # BLD: 2 K/UL (ref 1–5.1)
LYMPHOCYTES NFR BLD: 35.6 %
MCH RBC QN AUTO: 20.8 PG (ref 26–34)
MCHC RBC AUTO-ENTMCNC: 31.2 G/DL (ref 31–36)
MCV RBC AUTO: 66.7 FL (ref 80–100)
MONOCYTES # BLD: 0.3 K/UL (ref 0–1.3)
MONOCYTES NFR BLD: 6.1 %
NEUTROPHILS # BLD: 3.2 K/UL (ref 1.7–7.7)
NEUTROPHILS NFR BLD: 56.5 %
PATH INTERP BLD-IMP: NO
PLATELET # BLD AUTO: 347 K/UL (ref 135–450)
PMV BLD AUTO: 7.1 FL (ref 5–10.5)
POTASSIUM SERPL-SCNC: 3.9 MMOL/L (ref 3.5–5.1)
PROT SERPL-MCNC: 7.2 G/DL (ref 6.4–8.2)
RBC # BLD AUTO: 6.84 M/UL (ref 4.2–5.9)
SODIUM SERPL-SCNC: 141 MMOL/L (ref 136–145)
TRIGL SERPL-MCNC: 175 MG/DL (ref 0–150)
VLDLC SERPL CALC-MCNC: 35 MG/DL
WBC # BLD AUTO: 5.6 K/UL (ref 4–11)

## 2024-04-22 PROCEDURE — 36415 COLL VENOUS BLD VENIPUNCTURE: CPT

## 2024-04-22 PROCEDURE — 86901 BLOOD TYPING SEROLOGIC RH(D): CPT

## 2024-04-22 PROCEDURE — 80061 LIPID PANEL: CPT

## 2024-04-22 PROCEDURE — 82306 VITAMIN D 25 HYDROXY: CPT

## 2024-04-22 PROCEDURE — 85025 COMPLETE CBC W/AUTO DIFF WBC: CPT

## 2024-04-22 PROCEDURE — 80053 COMPREHEN METABOLIC PANEL: CPT

## 2024-04-22 PROCEDURE — 83036 HEMOGLOBIN GLYCOSYLATED A1C: CPT

## 2024-04-22 PROCEDURE — 86900 BLOOD TYPING SEROLOGIC ABO: CPT

## 2024-04-22 NOTE — RESULT ENCOUNTER NOTE
Labs are stable without significant change from last test.    OK to follow up as scheduled to review results in detail.    Adonay Burgos MD

## 2024-04-23 LAB
EST. AVERAGE GLUCOSE BLD GHB EST-MCNC: 111.2 MG/DL
HBA1C MFR BLD: 5.5 %

## 2024-05-03 ENCOUNTER — OFFICE VISIT (OUTPATIENT)
Dept: PRIMARY CARE CLINIC | Age: 44
End: 2024-05-03

## 2024-05-03 VITALS
WEIGHT: 167 LBS | DIASTOLIC BLOOD PRESSURE: 76 MMHG | HEART RATE: 72 BPM | BODY MASS INDEX: 23.29 KG/M2 | SYSTOLIC BLOOD PRESSURE: 122 MMHG | RESPIRATION RATE: 17 BRPM

## 2024-05-03 DIAGNOSIS — Z12.11 COLON CANCER SCREENING: ICD-10-CM

## 2024-05-03 DIAGNOSIS — L57.0 ACTINIC KERATOSIS: ICD-10-CM

## 2024-05-03 DIAGNOSIS — G61.0 GUILLAIN BARRÉ SYNDROME (HCC): ICD-10-CM

## 2024-05-03 DIAGNOSIS — E78.00 HYPERCHOLESTEROLEMIA: Primary | ICD-10-CM

## 2024-05-03 DIAGNOSIS — Z12.5 PROSTATE CANCER SCREENING: ICD-10-CM

## 2024-05-03 DIAGNOSIS — E55.9 VITAMIN D DEFICIENCY: ICD-10-CM

## 2024-05-03 RX ORDER — ATORVASTATIN CALCIUM 10 MG/1
10 TABLET, FILM COATED ORAL DAILY
Qty: 90 TABLET | Refills: 1 | Status: SHIPPED | OUTPATIENT
Start: 2024-05-03

## 2024-05-03 ASSESSMENT — PATIENT HEALTH QUESTIONNAIRE - PHQ9
SUM OF ALL RESPONSES TO PHQ QUESTIONS 1-9: 0
SUM OF ALL RESPONSES TO PHQ9 QUESTIONS 1 & 2: 0
1. LITTLE INTEREST OR PLEASURE IN DOING THINGS: NOT AT ALL
SUM OF ALL RESPONSES TO PHQ QUESTIONS 1-9: 0
2. FEELING DOWN, DEPRESSED OR HOPELESS: NOT AT ALL
SUM OF ALL RESPONSES TO PHQ QUESTIONS 1-9: 0
SUM OF ALL RESPONSES TO PHQ QUESTIONS 1-9: 0

## 2024-05-03 NOTE — PROGRESS NOTES
5/3/2024   Macie Madison  1980    The patients PMH, surgical history, family history, medications, allergies were all reviewed and updated as appropriate today.     Current Outpatient Medications on File Prior to Visit   Medication Sig Dispense Refill    atorvastatin (LIPITOR) 10 MG tablet Take 1 tablet by mouth daily 90 tablet 1    Ginseng 100 MG CAPS Take by mouth      acetaminophen (TYLENOL) 500 MG tablet Take 1 tablet by mouth every 6 hours as needed for Pain      Cholecalciferol (VITAMIN D) 50 MCG (2000 UT) CAPS capsule Take 2 capsules by mouth daily 30 capsule 11     No current facility-administered medications on file prior to visit.        Chief Complaint   Patient presents with    6 Month Follow-Up       HPI:  here for his Annual Physical today  Needs labs reviewed and labs ordered in 6 months  Past Medical History:   Diagnosis Date    Guillain-Kirby (HCC)      Past Surgical History:   Procedure Laterality Date    INNER EAR SURGERY      OTHER SURGICAL HISTORY  10/29/12    port removal    TUNNELED VENOUS CATHETER PLACEMENT  10/16/12    right subclavian     Social History     Socioeconomic History    Marital status:      Spouse name: None    Number of children: None    Years of education: None    Highest education level: None   Tobacco Use    Smoking status: Never    Smokeless tobacco: Never   Substance and Sexual Activity    Alcohol use: Yes     Alcohol/week: 0.0 standard drinks of alcohol     Comment: rre    Drug use: No    Sexual activity: Yes     Partners: Female     Social Determinants of Health     Financial Resource Strain: Low Risk  (11/3/2023)    Overall Financial Resource Strain (CARDIA)     Difficulty of Paying Living Expenses: Not hard at all   Transportation Needs: Unknown (11/3/2023)    PRAPARE - Transportation     Lack of Transportation (Non-Medical): No   Housing Stability: Unknown (11/3/2023)    Housing Stability Vital Sign     Unstable Housing in the Last Year: No     Family

## 2024-07-05 LAB — NONINV COLON CA DNA+OCC BLD SCRN STL QL: NEGATIVE

## 2024-07-12 ENCOUNTER — OFFICE VISIT (OUTPATIENT)
Dept: PRIMARY CARE CLINIC | Age: 44
End: 2024-07-12
Payer: COMMERCIAL

## 2024-07-12 VITALS
HEART RATE: 78 BPM | RESPIRATION RATE: 16 BRPM | BODY MASS INDEX: 23.29 KG/M2 | WEIGHT: 167 LBS | SYSTOLIC BLOOD PRESSURE: 156 MMHG | DIASTOLIC BLOOD PRESSURE: 92 MMHG

## 2024-07-12 DIAGNOSIS — G61.0 GUILLAIN BARRÉ SYNDROME (HCC): ICD-10-CM

## 2024-07-12 DIAGNOSIS — N52.9 ERECTILE DYSFUNCTION, UNSPECIFIED ERECTILE DYSFUNCTION TYPE: ICD-10-CM

## 2024-07-12 DIAGNOSIS — E78.00 HYPERCHOLESTEROLEMIA: ICD-10-CM

## 2024-07-12 DIAGNOSIS — D56.1 BETA THALASSEMIA (HCC): Primary | ICD-10-CM

## 2024-07-12 DIAGNOSIS — E55.9 VITAMIN D DEFICIENCY: ICD-10-CM

## 2024-07-12 PROCEDURE — 99213 OFFICE O/P EST LOW 20 MIN: CPT | Performed by: INTERNAL MEDICINE

## 2024-07-12 NOTE — PROGRESS NOTES
7/12/2024   Macie Madison  1980    The patients PMH, surgical history, family history, medications, allergies were all reviewed and updated as appropriate today.     Current Outpatient Medications on File Prior to Visit   Medication Sig Dispense Refill    atorvastatin (LIPITOR) 10 MG tablet Take 1 tablet by mouth daily 90 tablet 1    acetaminophen (TYLENOL) 500 MG tablet Take 1 tablet by mouth every 6 hours as needed for Pain      Cholecalciferol (VITAMIN D) 50 MCG (2000 UT) CAPS capsule Take 2 capsules by mouth daily 30 capsule 11     No current facility-administered medications on file prior to visit.                HPI:  pt wants to discuss his health today, has noticed issues with ED since returning from [facility] last month, he is concerned that he will try to become a father in about 1 year and wants to be checked out for this  Last labs were done 4/22 which showed chol 175 and CBC c/w known thallassemia    Review of Systems    OBJECTIVE:  BP (!) 156/92   Pulse 78   Resp 16   Wt 75.8 kg (167 lb)   BMI 23.29 kg/m²    Wt Readings from Last 3 Encounters:   07/12/24 75.8 kg (167 lb)   05/03/24 75.8 kg (167 lb)   11/03/23 76.5 kg (168 lb 9.6 oz)       Physical Exam  Vitals and nursing note reviewed.   Constitutional:       General: He is not in acute distress.     Appearance: He is well-developed.      Comments: BP (!) 156/92   Pulse 78   Resp 16   Wt 75.8 kg (167 lb)   BMI 23.29 kg/m²    HENT:      Head: Normocephalic and atraumatic.   Eyes:      General: No scleral icterus.        Right eye: No discharge.         Left eye: No discharge.      Conjunctiva/sclera: Conjunctivae normal.      Pupils: Pupils are equal, round, and reactive to light.   Neck:      Thyroid: No thyromegaly.      Vascular: No JVD.      Trachea: No tracheal deviation.   Cardiovascular:      Rate and Rhythm: Normal rate and regular rhythm.      Heart sounds: Normal heart sounds. No murmur heard.  Pulmonary:      Effort: Pulmonary

## 2024-11-26 ENCOUNTER — TELEPHONE (OUTPATIENT)
Dept: PRIMARY CARE CLINIC | Age: 44
End: 2024-11-26

## 2024-11-26 DIAGNOSIS — E78.00 HYPERCHOLESTEROLEMIA: ICD-10-CM

## 2024-11-26 DIAGNOSIS — E55.9 VITAMIN D DEFICIENCY: Primary | ICD-10-CM

## 2024-11-26 DIAGNOSIS — Z12.5 PROSTATE CANCER SCREENING: ICD-10-CM

## 2024-11-26 NOTE — TELEPHONE ENCOUNTER
Pt calling requesting lab orders to be done prior to his appt with PCP for his six mons f/u on 12/6/2024 at 3:00 pm. Pt is requesting a phone call when labs are ordered.

## 2024-11-29 ENCOUNTER — HOSPITAL ENCOUNTER (OUTPATIENT)
Age: 44
Discharge: HOME | End: 2024-11-29
Payer: COMMERCIAL

## 2024-11-29 DIAGNOSIS — E78.00 HYPERCHOLESTEROLEMIA: ICD-10-CM

## 2024-11-29 DIAGNOSIS — Z12.5 PROSTATE CANCER SCREENING: ICD-10-CM

## 2024-11-29 DIAGNOSIS — E55.9 VITAMIN D DEFICIENCY: ICD-10-CM

## 2024-11-29 LAB
25(OH)D3 SERPL-MCNC: 19.1 NG/ML
ALBUMIN SERPL-MCNC: 4.8 G/DL (ref 3.4–5)
ALBUMIN/GLOB SERPL: 1.8 {RATIO} (ref 1.1–2.2)
ALP SERPL-CCNC: 70 U/L (ref 40–129)
ALT SERPL-CCNC: 36 U/L (ref 10–40)
ANION GAP SERPL CALCULATED.3IONS-SCNC: 12 MMOL/L (ref 3–16)
AST SERPL-CCNC: 27 U/L (ref 15–37)
BASOPHILS # BLD: 0 K/UL (ref 0–0.2)
BASOPHILS NFR BLD: 0.6 %
BILIRUB SERPL-MCNC: 0.5 MG/DL (ref 0–1)
BUN SERPL-MCNC: 13 MG/DL (ref 7–20)
CALCIUM SERPL-MCNC: 9.4 MG/DL (ref 8.3–10.6)
CHLORIDE SERPL-SCNC: 109 MMOL/L (ref 99–110)
CHOLEST SERPL-MCNC: 172 MG/DL (ref 0–199)
CO2 SERPL-SCNC: 27 MMOL/L (ref 21–32)
CREAT SERPL-MCNC: 0.9 MG/DL (ref 0.9–1.3)
DEPRECATED RDW RBC AUTO: 15.8 % (ref 12.4–15.4)
EOSINOPHIL # BLD: 0.1 K/UL (ref 0–0.6)
EOSINOPHIL NFR BLD: 1.7 %
GFR SERPLBLD CREATININE-BSD FMLA CKD-EPI: >90 ML/MIN/{1.73_M2}
GLUCOSE P FAST SERPL-MCNC: 93 MG/DL (ref 70–99)
HCT VFR BLD AUTO: 46.2 % (ref 40.5–52.5)
HDLC SERPL-MCNC: 44 MG/DL (ref 40–60)
HGB BLD-MCNC: 14.7 G/DL (ref 13.5–17.5)
LDL CHOLESTEROL: 87 MG/DL
LYMPHOCYTES # BLD: 2.7 K/UL (ref 1–5.1)
LYMPHOCYTES NFR BLD: 45.1 %
MCH RBC QN AUTO: 21.1 PG (ref 26–34)
MCHC RBC AUTO-ENTMCNC: 31.7 G/DL (ref 31–36)
MCV RBC AUTO: 66.6 FL (ref 80–100)
MONOCYTES # BLD: 0.3 K/UL (ref 0–1.3)
MONOCYTES NFR BLD: 5.3 %
NEUTROPHILS # BLD: 2.9 K/UL (ref 1.7–7.7)
NEUTROPHILS NFR BLD: 47.3 %
PATH INTERP BLD-IMP: NO
PLATELET # BLD AUTO: 343 K/UL (ref 135–450)
PMV BLD AUTO: 7.2 FL (ref 5–10.5)
POTASSIUM SERPL-SCNC: 5.1 MMOL/L (ref 3.5–5.1)
PROT SERPL-MCNC: 7.5 G/DL (ref 6.4–8.2)
PSA SERPL DL<=0.01 NG/ML-MCNC: 0.77 NG/ML (ref 0–4)
RBC # BLD AUTO: 6.94 M/UL (ref 4.2–5.9)
SODIUM SERPL-SCNC: 148 MMOL/L (ref 136–145)
TRIGL SERPL-MCNC: 207 MG/DL (ref 0–150)
VLDLC SERPL CALC-MCNC: 41 MG/DL
WBC # BLD AUTO: 6.1 K/UL (ref 4–11)

## 2024-11-29 PROCEDURE — 85025 COMPLETE CBC W/AUTO DIFF WBC: CPT

## 2024-11-29 PROCEDURE — 84153 ASSAY OF PSA TOTAL: CPT

## 2024-11-29 PROCEDURE — 80061 LIPID PANEL: CPT

## 2024-11-29 PROCEDURE — 82306 VITAMIN D 25 HYDROXY: CPT

## 2024-11-29 PROCEDURE — 36415 COLL VENOUS BLD VENIPUNCTURE: CPT

## 2024-11-29 PROCEDURE — 80053 COMPREHEN METABOLIC PANEL: CPT

## 2024-12-02 ENCOUNTER — TELEPHONE (OUTPATIENT)
Dept: PRIMARY CARE CLINIC | Age: 44
End: 2024-12-02

## 2024-12-02 NOTE — TELEPHONE ENCOUNTER
I have contacted this patient by phone with the following results     low vitamin D levels     Patient will discuss in detail at next visit with PCP

## 2024-12-02 NOTE — TELEPHONE ENCOUNTER
----- Message from Dr. Adonay Burgos MD sent at 12/2/2024  7:45 AM EST -----  We will address low vitamin D level at next visit    Adonay Burgos MD

## 2024-12-06 ENCOUNTER — OFFICE VISIT (OUTPATIENT)
Dept: PRIMARY CARE CLINIC | Age: 44
End: 2024-12-06
Payer: COMMERCIAL

## 2024-12-06 VITALS
SYSTOLIC BLOOD PRESSURE: 130 MMHG | HEART RATE: 87 BPM | HEIGHT: 71 IN | DIASTOLIC BLOOD PRESSURE: 70 MMHG | OXYGEN SATURATION: 98 % | WEIGHT: 172.4 LBS | BODY MASS INDEX: 24.14 KG/M2

## 2024-12-06 DIAGNOSIS — E55.9 VITAMIN D DEFICIENCY: ICD-10-CM

## 2024-12-06 DIAGNOSIS — D56.1 BETA THALASSEMIA (HCC): ICD-10-CM

## 2024-12-06 DIAGNOSIS — G61.0 GUILLAIN BARRÉ SYNDROME (HCC): Primary | ICD-10-CM

## 2024-12-06 DIAGNOSIS — E78.00 HYPERCHOLESTEROLEMIA: ICD-10-CM

## 2024-12-06 PROCEDURE — 99213 OFFICE O/P EST LOW 20 MIN: CPT | Performed by: INTERNAL MEDICINE

## 2024-12-06 RX ORDER — ERGOCALCIFEROL 1.25 MG/1
50000 CAPSULE, LIQUID FILLED ORAL WEEKLY
Qty: 12 CAPSULE | Refills: 1 | Status: SHIPPED | OUTPATIENT
Start: 2024-12-06

## 2024-12-06 RX ORDER — ATORVASTATIN CALCIUM 10 MG/1
10 TABLET, FILM COATED ORAL DAILY
Qty: 90 TABLET | Refills: 1 | Status: SHIPPED | OUTPATIENT
Start: 2024-12-06

## 2024-12-06 SDOH — ECONOMIC STABILITY: FOOD INSECURITY: WITHIN THE PAST 12 MONTHS, YOU WORRIED THAT YOUR FOOD WOULD RUN OUT BEFORE YOU GOT MONEY TO BUY MORE.: NEVER TRUE

## 2024-12-06 SDOH — ECONOMIC STABILITY: FOOD INSECURITY: WITHIN THE PAST 12 MONTHS, THE FOOD YOU BOUGHT JUST DIDN'T LAST AND YOU DIDN'T HAVE MONEY TO GET MORE.: NEVER TRUE

## 2024-12-06 SDOH — ECONOMIC STABILITY: INCOME INSECURITY: HOW HARD IS IT FOR YOU TO PAY FOR THE VERY BASICS LIKE FOOD, HOUSING, MEDICAL CARE, AND HEATING?: NOT HARD AT ALL

## 2024-12-06 ASSESSMENT — PATIENT HEALTH QUESTIONNAIRE - PHQ9
2. FEELING DOWN, DEPRESSED OR HOPELESS: NOT AT ALL
SUM OF ALL RESPONSES TO PHQ9 QUESTIONS 1 & 2: 0
SUM OF ALL RESPONSES TO PHQ QUESTIONS 1-9: 0
1. LITTLE INTEREST OR PLEASURE IN DOING THINGS: NOT AT ALL

## 2024-12-06 ASSESSMENT — ANXIETY QUESTIONNAIRES
3. WORRYING TOO MUCH ABOUT DIFFERENT THINGS: NOT AT ALL
1. FEELING NERVOUS, ANXIOUS, OR ON EDGE: NOT AT ALL
GAD7 TOTAL SCORE: 0
6. BECOMING EASILY ANNOYED OR IRRITABLE: NOT AT ALL
IF YOU CHECKED OFF ANY PROBLEMS ON THIS QUESTIONNAIRE, HOW DIFFICULT HAVE THESE PROBLEMS MADE IT FOR YOU TO DO YOUR WORK, TAKE CARE OF THINGS AT HOME, OR GET ALONG WITH OTHER PEOPLE: NOT DIFFICULT AT ALL
7. FEELING AFRAID AS IF SOMETHING AWFUL MIGHT HAPPEN: NOT AT ALL
4. TROUBLE RELAXING: NOT AT ALL
2. NOT BEING ABLE TO STOP OR CONTROL WORRYING: NOT AT ALL
5. BEING SO RESTLESS THAT IT IS HARD TO SIT STILL: NOT AT ALL

## 2024-12-06 NOTE — PROGRESS NOTES
12/6/2024   Macie Madison  1980    The patients PMH, surgical history, family history, medications, allergies were all reviewed and updated as appropriate today.     Current Outpatient Medications on File Prior to Visit   Medication Sig Dispense Refill    acetaminophen (TYLENOL) 500 MG tablet Take 1 tablet by mouth every 6 hours as needed for Pain      Cholecalciferol (VITAMIN D) 50 MCG (2000 UT) CAPS capsule Take 2 capsules by mouth daily 30 capsule 11     No current facility-administered medications on file prior to visit.        Chief Complaint   Patient presents with    Follow-up    Neurologic Problem     Guillain Barré syndrome (HCC)    Other     Vit D deficiency        HPI:  here to review last labs    Review of Systems  Wants A1C added to next labs in 6 months    OBJECTIVE:  /70 (Site: Left Upper Arm, Position: Sitting)   Pulse 87   Ht 1.803 m (5' 11\")   Wt 78.2 kg (172 lb 6.4 oz)   SpO2 98%   BMI 24.04 kg/m²    Wt Readings from Last 3 Encounters:   12/06/24 78.2 kg (172 lb 6.4 oz)   07/12/24 75.8 kg (167 lb)   05/03/24 75.8 kg (167 lb)       Physical Exam  Constitutional:       Comments: Thin  male   Neurological:      Comments: Plodding gait from GB residua         Data Review:   CBC:   Lab Results   Component Value Date/Time    WBC 6.1 11/29/2024 08:30 AM    WBC 5.6 04/22/2024 08:32 AM    WBC 5.7 10/28/2023 06:57 AM    HGB 14.7 11/29/2024 08:30 AM    HGB 14.3 04/22/2024 08:32 AM    HGB 14.7 10/28/2023 06:57 AM    HCT 46.2 11/29/2024 08:30 AM    HCT 45.7 04/22/2024 08:32 AM    HCT 46.5 10/28/2023 06:57 AM    MCV 66.6 11/29/2024 08:30 AM    MCV 66.7 04/22/2024 08:32 AM    MCV 66.6 10/28/2023 06:57 AM     11/29/2024 08:30 AM     04/22/2024 08:32 AM     10/28/2023 06:57 AM     Chemistry:   Lab Results   Component Value Date/Time     11/29/2024 08:30 AM     04/22/2024 08:32 AM     10/28/2023 06:57 AM    K 5.1 11/29/2024 08:30 AM    K 3.9 04/22/2024

## 2025-03-04 ENCOUNTER — OFFICE VISIT (OUTPATIENT)
Age: 45
End: 2025-03-04

## 2025-03-04 VITALS
SYSTOLIC BLOOD PRESSURE: 119 MMHG | BODY MASS INDEX: 24.08 KG/M2 | TEMPERATURE: 98.5 F | HEART RATE: 95 BPM | DIASTOLIC BLOOD PRESSURE: 83 MMHG | WEIGHT: 172 LBS | HEIGHT: 71 IN | OXYGEN SATURATION: 97 %

## 2025-03-04 DIAGNOSIS — L03.213 PRESEPTAL CELLULITIS OF LEFT EYE: Primary | ICD-10-CM

## 2025-03-04 RX ORDER — SULFAMETHOXAZOLE AND TRIMETHOPRIM 800; 160 MG/1; MG/1
1 TABLET ORAL 2 TIMES DAILY
Qty: 20 TABLET | Refills: 0 | Status: SHIPPED | OUTPATIENT
Start: 2025-03-04 | End: 2025-03-14

## 2025-03-04 RX ORDER — CEPHALEXIN 500 MG/1
500 CAPSULE ORAL 4 TIMES DAILY
Qty: 40 CAPSULE | Refills: 0 | Status: SHIPPED | OUTPATIENT
Start: 2025-03-04 | End: 2025-03-14

## 2025-03-04 RX ORDER — ERYTHROMYCIN 5 MG/G
OINTMENT OPHTHALMIC
Qty: 3.5 G | Refills: 0 | Status: SHIPPED | OUTPATIENT
Start: 2025-03-04

## 2025-03-04 ASSESSMENT — ENCOUNTER SYMPTOMS
RHINORRHEA: 0
EYE PAIN: 0
EYE DISCHARGE: 0
EYE REDNESS: 1
EYE ITCHING: 1

## 2025-03-04 NOTE — PROGRESS NOTES
Head: Normocephalic and atraumatic.   Eyes:      Pupils: Pupils are equal, round, and reactive to light.      Comments: Left lower eyelid has mild edema and erythema, worse medially.  No wounds, tenderness, fluctuance or stye.  Left eye conjunctiva has mild erythema but no chemosis, proptosis, hyphema or subconjunctival hemorrhage.  Normal EOM with no pain   Pulmonary:      Effort: Pulmonary effort is normal. No respiratory distress.   Musculoskeletal:      Cervical back: Normal range of motion and neck supple.   Neurological:      Mental Status: He is alert.   Psychiatric:         Mood and Affect: Mood normal.         Behavior: Behavior normal.         Thought Content: Thought content normal.         Judgment: Judgment normal.         Chart reviewed - reviewed PCP note from visit 12/6/24 - hx guillain barre, vitamin D def, HLD, beat thalassemia       An electronic signature was used to authenticate this note.    --ANTONI SchumacherC

## 2025-03-04 NOTE — PATIENT INSTRUCTIONS
New Prescriptions    CEPHALEXIN (KEFLEX) 500 MG CAPSULE    Take 1 capsule by mouth 4 times daily for 10 days    ERYTHROMYCIN (ROMYCIN) 5 MG/GM OPHTHALMIC OINTMENT    Apply pea sized amount to the left eye 4 times a day for 7 days    SULFAMETHOXAZOLE-TRIMETHOPRIM (BACTRIM DS) 800-160 MG PER TABLET    Take 1 tablet by mouth 2 times daily for 10 days     -Follow up with your primary care doctor for reevaluation in the next few days    -If you symptoms worsen, go to the nearest Emergency Department

## 2025-03-07 ENCOUNTER — OFFICE VISIT (OUTPATIENT)
Dept: PRIMARY CARE CLINIC | Age: 45
End: 2025-03-07
Payer: COMMERCIAL

## 2025-03-07 VITALS
HEART RATE: 93 BPM | SYSTOLIC BLOOD PRESSURE: 120 MMHG | BODY MASS INDEX: 23.54 KG/M2 | DIASTOLIC BLOOD PRESSURE: 82 MMHG | WEIGHT: 168.8 LBS | OXYGEN SATURATION: 98 %

## 2025-03-07 DIAGNOSIS — H00.15 CHALAZION OF LEFT LOWER EYELID: Primary | ICD-10-CM

## 2025-03-07 PROCEDURE — 99213 OFFICE O/P EST LOW 20 MIN: CPT | Performed by: INTERNAL MEDICINE

## 2025-03-07 RX ORDER — CIPROFLOXACIN HYDROCHLORIDE 3.5 MG/ML
1 SOLUTION/ DROPS TOPICAL
Qty: 1 EACH | Refills: 0 | Status: SHIPPED | OUTPATIENT
Start: 2025-03-07 | End: 2025-03-17

## 2025-03-07 SDOH — ECONOMIC STABILITY: FOOD INSECURITY: WITHIN THE PAST 12 MONTHS, THE FOOD YOU BOUGHT JUST DIDN'T LAST AND YOU DIDN'T HAVE MONEY TO GET MORE.: NEVER TRUE

## 2025-03-07 SDOH — ECONOMIC STABILITY: FOOD INSECURITY: WITHIN THE PAST 12 MONTHS, YOU WORRIED THAT YOUR FOOD WOULD RUN OUT BEFORE YOU GOT MONEY TO BUY MORE.: NEVER TRUE

## 2025-03-07 ASSESSMENT — PATIENT HEALTH QUESTIONNAIRE - PHQ9
SUM OF ALL RESPONSES TO PHQ QUESTIONS 1-9: 0
SUM OF ALL RESPONSES TO PHQ QUESTIONS 1-9: 0
2. FEELING DOWN, DEPRESSED OR HOPELESS: NOT AT ALL
SUM OF ALL RESPONSES TO PHQ QUESTIONS 1-9: 0
1. LITTLE INTEREST OR PLEASURE IN DOING THINGS: NOT AT ALL
SUM OF ALL RESPONSES TO PHQ QUESTIONS 1-9: 0

## 2025-03-07 NOTE — PROGRESS NOTES
11/29/2024 08:30 AM     04/22/2024 08:32 AM     10/28/2023 06:57 AM    K 5.1 11/29/2024 08:30 AM    K 3.9 04/22/2024 08:32 AM    K 5.0 10/28/2023 06:57 AM     11/29/2024 08:30 AM     04/22/2024 08:32 AM     10/28/2023 06:57 AM    CO2 27 11/29/2024 08:30 AM    CO2 26 04/22/2024 08:32 AM    CO2 27 10/28/2023 06:57 AM    BUN 13 11/29/2024 08:30 AM    BUN 11 04/22/2024 08:32 AM    BUN 12 10/28/2023 06:57 AM    CREATININE 0.9 11/29/2024 08:30 AM    CREATININE 0.8 04/22/2024 08:32 AM    CREATININE 0.9 10/28/2023 06:57 AM     Hepatic Function:   Lab Results   Component Value Date/Time    AST 27 11/29/2024 08:30 AM    AST 21 04/22/2024 08:32 AM    AST 22 10/28/2023 06:57 AM    ALT 36 11/29/2024 08:30 AM    ALT 29 04/22/2024 08:32 AM    ALT 32 10/28/2023 06:57 AM    BILIDIR 0.10 10/10/2012 01:10 PM    BILITOT 0.5 11/29/2024 08:30 AM    BILITOT 0.6 04/22/2024 08:32 AM    BILITOT 0.5 10/28/2023 06:57 AM    ALKPHOS 70 11/29/2024 08:30 AM    ALKPHOS 66 04/22/2024 08:32 AM    ALKPHOS 72 10/28/2023 06:57 AM     No results found for: \"LIPASE\", \"AMYLASE\"  Lipids:   Lab Results   Component Value Date/Time    CHOL 207 06/26/2017 06:35 AM    HDL 44 11/29/2024 08:30 AM    TRIG 179 06/26/2017 06:35 AM       ASSESSMENT/PLAN  1.) Chalazion L lower OS- add ciloxan qtts and f/u with Ophth if persists into next week    Adonay Burgos MD        Electronically signed by Adonay Burgos MD on 3/7/2025 at 2:48 PM

## 2025-04-10 DIAGNOSIS — E55.9 VITAMIN D DEFICIENCY: ICD-10-CM

## 2025-04-11 RX ORDER — ERGOCALCIFEROL 1.25 MG/1
50000 CAPSULE, LIQUID FILLED ORAL WEEKLY
Qty: 12 CAPSULE | Refills: 0 | Status: SHIPPED | OUTPATIENT
Start: 2025-04-11

## 2025-07-06 DIAGNOSIS — E78.00 HYPERCHOLESTEROLEMIA: ICD-10-CM

## 2025-07-07 ENCOUNTER — RESULTS FOLLOW-UP (OUTPATIENT)
Dept: PRIMARY CARE CLINIC | Age: 45
End: 2025-07-07

## 2025-07-07 ENCOUNTER — HOSPITAL ENCOUNTER (OUTPATIENT)
Age: 45
Discharge: HOME | End: 2025-07-07
Payer: COMMERCIAL

## 2025-07-07 DIAGNOSIS — E55.9 VITAMIN D DEFICIENCY: ICD-10-CM

## 2025-07-07 DIAGNOSIS — E78.00 HYPERCHOLESTEROLEMIA: ICD-10-CM

## 2025-07-07 DIAGNOSIS — E87.5 HYPERKALEMIA: Primary | ICD-10-CM

## 2025-07-07 LAB
25(OH)D3 SERPL-MCNC: 39.2 NG/ML
ALBUMIN SERPL-MCNC: 4.6 G/DL (ref 3.4–5)
ALBUMIN/GLOB SERPL: 1.6 {RATIO} (ref 1.1–2.2)
ALP SERPL-CCNC: 64 U/L (ref 40–129)
ALT SERPL-CCNC: 36 U/L (ref 10–40)
ANION GAP SERPL CALCULATED.3IONS-SCNC: 9 MMOL/L (ref 3–16)
AST SERPL-CCNC: 23 U/L (ref 15–37)
BASOPHILS # BLD: 0 K/UL (ref 0–0.2)
BASOPHILS NFR BLD: 0.8 %
BILIRUB SERPL-MCNC: 0.5 MG/DL (ref 0–1)
BUN SERPL-MCNC: 14 MG/DL (ref 7–20)
CALCIUM SERPL-MCNC: 9.4 MG/DL (ref 8.3–10.6)
CHLORIDE SERPL-SCNC: 104 MMOL/L (ref 99–110)
CHOLEST SERPL-MCNC: 188 MG/DL (ref 0–199)
CO2 SERPL-SCNC: 26 MMOL/L (ref 21–32)
CREAT SERPL-MCNC: 0.8 MG/DL (ref 0.9–1.3)
DEPRECATED RDW RBC AUTO: 15.7 % (ref 12.4–15.4)
EOSINOPHIL # BLD: 0.1 K/UL (ref 0–0.6)
EOSINOPHIL NFR BLD: 2.3 %
GFR SERPLBLD CREATININE-BSD FMLA CKD-EPI: >90 ML/MIN/{1.73_M2}
GLUCOSE P FAST SERPL-MCNC: 87 MG/DL (ref 70–99)
HCT VFR BLD AUTO: 42.5 % (ref 40.5–52.5)
HDLC SERPL-MCNC: 44 MG/DL (ref 40–60)
HGB BLD-MCNC: 13.7 G/DL (ref 13.5–17.5)
LDL CHOLESTEROL: 93 MG/DL
LYMPHOCYTES # BLD: 1.6 K/UL (ref 1–5.1)
LYMPHOCYTES NFR BLD: 34.7 %
MCH RBC QN AUTO: 21 PG (ref 26–34)
MCHC RBC AUTO-ENTMCNC: 32.1 G/DL (ref 31–36)
MCV RBC AUTO: 65.5 FL (ref 80–100)
MONOCYTES # BLD: 0.3 K/UL (ref 0–1.3)
MONOCYTES NFR BLD: 6.7 %
NEUTROPHILS # BLD: 2.6 K/UL (ref 1.7–7.7)
NEUTROPHILS NFR BLD: 55.5 %
PATH INTERP BLD-IMP: NO
PLATELET # BLD AUTO: 338 K/UL (ref 135–450)
PMV BLD AUTO: 7.3 FL (ref 5–10.5)
POTASSIUM SERPL-SCNC: 5.4 MMOL/L (ref 3.5–5.1)
PROT SERPL-MCNC: 7.4 G/DL (ref 6.4–8.2)
RBC # BLD AUTO: 6.5 M/UL (ref 4.2–5.9)
SODIUM SERPL-SCNC: 139 MMOL/L (ref 136–145)
TRIGL SERPL-MCNC: 257 MG/DL (ref 0–150)
VLDLC SERPL CALC-MCNC: 51 MG/DL
WBC # BLD AUTO: 4.7 K/UL (ref 4–11)

## 2025-07-07 PROCEDURE — 36415 COLL VENOUS BLD VENIPUNCTURE: CPT

## 2025-07-07 PROCEDURE — 83036 HEMOGLOBIN GLYCOSYLATED A1C: CPT

## 2025-07-07 PROCEDURE — 80061 LIPID PANEL: CPT

## 2025-07-07 PROCEDURE — 80053 COMPREHEN METABOLIC PANEL: CPT

## 2025-07-07 PROCEDURE — 85025 COMPLETE CBC W/AUTO DIFF WBC: CPT

## 2025-07-07 PROCEDURE — 82306 VITAMIN D 25 HYDROXY: CPT

## 2025-07-07 RX ORDER — ATORVASTATIN CALCIUM 10 MG/1
10 TABLET, FILM COATED ORAL DAILY
Qty: 90 TABLET | Refills: 0 | Status: SHIPPED | OUTPATIENT
Start: 2025-07-07 | End: 2025-07-11 | Stop reason: SDUPTHER

## 2025-07-07 NOTE — TELEPHONE ENCOUNTER
LastVisit 3/7/2025   LastLabs 11/29/2024   NextVisit 7/11/2025   LastRefilled 12/6/2024  Pharmacy:    [facility] - [place], [place] - [address] [place] [address] - P [phone] - F [phone]  [facility] [address]  Phone: [phone] Fax: [phone]     pharmacy confirmed in EPIC

## 2025-07-08 LAB
EST. AVERAGE GLUCOSE BLD GHB EST-MCNC: 114 MG/DL
HBA1C MFR BLD: 5.6 %

## 2025-07-10 ENCOUNTER — HOSPITAL ENCOUNTER (OUTPATIENT)
Age: 45
Discharge: HOME | End: 2025-07-10
Payer: COMMERCIAL

## 2025-07-10 DIAGNOSIS — E87.5 HYPERKALEMIA: ICD-10-CM

## 2025-07-10 LAB — POTASSIUM SERPL-SCNC: 3.8 MMOL/L (ref 3.5–5.1)

## 2025-07-10 PROCEDURE — 84132 ASSAY OF SERUM POTASSIUM: CPT

## 2025-07-10 PROCEDURE — 36415 COLL VENOUS BLD VENIPUNCTURE: CPT

## 2025-07-11 ENCOUNTER — OFFICE VISIT (OUTPATIENT)
Dept: PRIMARY CARE CLINIC | Age: 45
End: 2025-07-11
Payer: COMMERCIAL

## 2025-07-11 VITALS
WEIGHT: 174.2 LBS | HEART RATE: 96 BPM | OXYGEN SATURATION: 98 % | BODY MASS INDEX: 24.3 KG/M2 | DIASTOLIC BLOOD PRESSURE: 76 MMHG | SYSTOLIC BLOOD PRESSURE: 118 MMHG

## 2025-07-11 DIAGNOSIS — E78.00 HYPERCHOLESTEROLEMIA: ICD-10-CM

## 2025-07-11 DIAGNOSIS — E55.9 VITAMIN D DEFICIENCY: ICD-10-CM

## 2025-07-11 DIAGNOSIS — D56.1 BETA THALASSEMIA (HCC): ICD-10-CM

## 2025-07-11 DIAGNOSIS — G61.0 GUILLAIN BARRÉ SYNDROME: Primary | ICD-10-CM

## 2025-07-11 PROCEDURE — 99213 OFFICE O/P EST LOW 20 MIN: CPT | Performed by: INTERNAL MEDICINE

## 2025-07-11 RX ORDER — ATORVASTATIN CALCIUM 10 MG/1
10 TABLET, FILM COATED ORAL DAILY
Qty: 90 TABLET | Refills: 1 | Status: SHIPPED | OUTPATIENT
Start: 2025-07-11

## 2025-07-11 RX ORDER — MULTIVIT-MIN/IRON/FOLIC ACID/K 18-600-40
4000 CAPSULE ORAL DAILY
Qty: 30 CAPSULE | Refills: 11 | Status: SHIPPED | OUTPATIENT
Start: 2025-07-11

## 2025-07-11 NOTE — PROGRESS NOTES
7/11/2025   Macie Madison  1980    The patients PMH, surgical history, family history, medications, allergies were all reviewed and updated as appropriate today.     Current Outpatient Medications on File Prior to Visit   Medication Sig Dispense Refill    acetaminophen (TYLENOL) 500 MG tablet Take 1 tablet by mouth every 6 hours as needed for Pain       No current facility-administered medications on file prior to visit.        Chief Complaint   Patient presents with    Eye Pain     Pt c/o left eye pain x 3 days. Pt denies discharge or injury to area.     Discuss Labs     Per pt here to discuss lab results and his potasium.        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   Constitutional:       General: He is not in acute distress.     Appearance: He is well-developed.      Comments: /76   Pulse 96   Wt 79 kg (174 lb 3.2 oz)   SpO2 98%   BMI 24.30 kg/m²    HENT:      Head: Normocephalic and atraumatic.   Eyes:      General: No scleral icterus.        Right eye: No discharge.         Left eye: No discharge.      Conjunctiva/sclera: Conjunctivae normal.      Pupils: Pupils are equal, round, and reactive to light.   Neck:      Thyroid: No thyromegaly.      Vascular: No JVD.      Trachea: No tracheal deviation.   Cardiovascular:      Rate and Rhythm: Normal rate and regular rhythm.      Heart sounds: Normal heart sounds. No murmur heard.  Pulmonary:      Effort: Pulmonary effort is normal. No respiratory distress.      Breath sounds: Normal breath sounds. No wheezing or rales.   Abdominal:      General: Bowel sounds are normal. There is no distension.      Palpations: Abdomen is soft.

## 2025-08-15 ENCOUNTER — OFFICE VISIT (OUTPATIENT)
Dept: PRIMARY CARE CLINIC | Age: 45
End: 2025-08-15
Payer: COMMERCIAL

## 2025-08-15 VITALS
DIASTOLIC BLOOD PRESSURE: 82 MMHG | SYSTOLIC BLOOD PRESSURE: 134 MMHG | HEART RATE: 91 BPM | BODY MASS INDEX: 24.35 KG/M2 | TEMPERATURE: 97.8 F | OXYGEN SATURATION: 97 % | WEIGHT: 174.6 LBS

## 2025-08-15 DIAGNOSIS — E78.00 HYPERCHOLESTEROLEMIA: ICD-10-CM

## 2025-08-15 DIAGNOSIS — E55.9 VITAMIN D DEFICIENCY: Primary | ICD-10-CM

## 2025-08-15 DIAGNOSIS — H00.015 HORDEOLUM EXTERNUM OF LEFT LOWER EYELID: ICD-10-CM

## 2025-08-15 PROCEDURE — 99213 OFFICE O/P EST LOW 20 MIN: CPT | Performed by: INTERNAL MEDICINE

## 2025-08-15 RX ORDER — CIPROFLOXACIN HYDROCHLORIDE 3.5 MG/ML
1 SOLUTION/ DROPS TOPICAL
Qty: 1 EACH | Refills: 0 | Status: SHIPPED | OUTPATIENT
Start: 2025-08-15 | End: 2025-08-25